# Patient Record
Sex: FEMALE | Race: OTHER
[De-identification: names, ages, dates, MRNs, and addresses within clinical notes are randomized per-mention and may not be internally consistent; named-entity substitution may affect disease eponyms.]

---

## 2019-02-04 ENCOUNTER — APPOINTMENT (OUTPATIENT)
Dept: ORTHOPEDIC SURGERY | Facility: CLINIC | Age: 44
End: 2019-02-04
Payer: COMMERCIAL

## 2019-02-04 PROCEDURE — 99213 OFFICE O/P EST LOW 20 MIN: CPT

## 2019-02-04 PROCEDURE — 73030 X-RAY EXAM OF SHOULDER: CPT | Mod: LT

## 2019-02-04 NOTE — PHYSICAL EXAM
[UE] : Sensory: Intact in bilateral upper extremities [Normal RUE] : Right Upper Extremity: No scars, rashes, lesions, ulcers, skin intact [Normal LUE] : Left Upper Extremity: No scars, rashes, lesions, ulcers, skin intact [Normal] : Alert and in no acute distress [de-identified] : Pain free active motion of head and neck. Normal appearance both shoulders. No warmth. No swelling. Right shoulder active motion normal. Intact deltoid and rotator cuff strength with manual muscle testing. Left shoulder tender over the greater tuberosity. Active motion within normal range but pain with empty can testing and weakness. Intact belly press and external rotation strength testing. [de-identified] : X-rays left shoulder AP, outlet and axillary views: Normal. No calcifications.

## 2019-02-04 NOTE — HISTORY OF PRESENT ILLNESS
[de-identified] : Left shoulder pain times several weeks. No history of specific injury. Patient has been exercising in the gym. Left shoulder hurts putting on a jacket and reaching out to the side. No treatment to date.

## 2019-02-04 NOTE — DISCUSSION/SUMMARY
[de-identified] : Discussion had with the patient. Recommend treatment with ibuprofen, ice, rest from upper body gym activities for the next several weeks. Followup in 3 weeks if symptoms persist.

## 2019-02-15 ENCOUNTER — TRANSCRIPTION ENCOUNTER (OUTPATIENT)
Age: 44
End: 2019-02-15

## 2019-02-15 ENCOUNTER — APPOINTMENT (OUTPATIENT)
Dept: ORTHOPEDIC SURGERY | Facility: CLINIC | Age: 44
End: 2019-02-15
Payer: COMMERCIAL

## 2019-02-15 VITALS
WEIGHT: 120 LBS | DIASTOLIC BLOOD PRESSURE: 67 MMHG | HEIGHT: 64 IN | HEART RATE: 69 BPM | SYSTOLIC BLOOD PRESSURE: 114 MMHG | BODY MASS INDEX: 20.49 KG/M2

## 2019-02-15 DIAGNOSIS — M75.82 OTHER SHOULDER LESIONS, LEFT SHOULDER: ICD-10-CM

## 2019-02-15 PROCEDURE — 99213 OFFICE O/P EST LOW 20 MIN: CPT | Mod: 25

## 2019-02-15 PROCEDURE — 20610 DRAIN/INJ JOINT/BURSA W/O US: CPT | Mod: LT

## 2020-07-27 ENCOUNTER — APPOINTMENT (OUTPATIENT)
Dept: ORTHOPEDIC SURGERY | Facility: CLINIC | Age: 45
End: 2020-07-27
Payer: COMMERCIAL

## 2020-07-27 VITALS
BODY MASS INDEX: 22.2 KG/M2 | WEIGHT: 130 LBS | SYSTOLIC BLOOD PRESSURE: 112 MMHG | HEIGHT: 64 IN | DIASTOLIC BLOOD PRESSURE: 71 MMHG | HEART RATE: 60 BPM

## 2020-07-27 DIAGNOSIS — S80.01XA CONTUSION OF RIGHT KNEE, INITIAL ENCOUNTER: ICD-10-CM

## 2020-07-27 DIAGNOSIS — M67.911 UNSPECIFIED DISORDER OF SYNOVIUM AND TENDON, RIGHT SHOULDER: ICD-10-CM

## 2020-07-27 PROCEDURE — 99214 OFFICE O/P EST MOD 30 MIN: CPT

## 2020-07-27 PROCEDURE — 73562 X-RAY EXAM OF KNEE 3: CPT | Mod: RT

## 2020-07-27 PROCEDURE — 73030 X-RAY EXAM OF SHOULDER: CPT | Mod: RT

## 2020-07-27 RX ORDER — DICLOFENAC SODIUM 50 MG/1
50 TABLET, DELAYED RELEASE ORAL
Qty: 40 | Refills: 1 | Status: ACTIVE | COMMUNITY
Start: 2020-07-27 | End: 1900-01-01

## 2020-07-27 NOTE — HISTORY OF PRESENT ILLNESS
[Bending] : worsened by bending [Knee Flexion] : worsened with knee flexion [Rest] : relieved by rest [de-identified] : Patient presents today with new onset right shoulder pain for several weeks and acute onset right medial sided knee pain. The shoulder has been bothering her with everyday activities and exercise. She started working with a physical therapist locally. She denies any specific injury to her right shoulder.\par \par Second complaint is right knee pain. She states she was improve her on a step down and did not realize there was a step and developed immediate onset of medial sided right knee pain. She is  medially but can walk without difficulty.

## 2020-07-27 NOTE — DISCUSSION/SUMMARY
[de-identified] : Rotator cuff tendinitis right shoulder. Knee contusion right knee. Continue to monitor clinically. Anti-inflammatory medication diclofenac prescribed. Physical therapy prescribed to treat her right shoulder. Advised on home exercise program for rotator cuff conditioning for the right shoulder.

## 2020-07-27 NOTE — PHYSICAL EXAM
[UE/LE] : Sensory: Intact in bilateral upper & lower extremities [Rad] : radial 2+ and symmetric bilaterally [Normal RUE] : Right Upper Extremity: No scars, rashes, lesions, ulcers, skin intact [Normal LUE] : Left Upper Extremity: No scars, rashes, lesions, ulcers, skin intact [Normal RLE] : Right Lower Extremity: No scars, rashes, lesions, ulcers, skin intact [Normal LLE] : Left Lower Extremity: No scars, rashes, lesions, ulcers, skin intact [Normal] : Oriented to person, place, and time, insight and judgement were intact and the affect was normal [Shoulder Instability] : negative Anterior-Posterior Slide [Pain Left Shoulder Active Forw Flexion Against Resistance] : negative Empty Can test [Shoulder Instab Anterior Apprehension (Crank) Test Left] : negative Crank test [Shoulder Muscle Weakness Supraspinatus Left Only] : negative Drop Arm test [Shoulder Pain Elicited During Seward's Test Left] : negative Umaña's test [Shoulder Pain During Lovett-Claude Impingement Test Left] : negative Lovett test [Shoulder Motion On Internal Rotation] : negative Lift Off test [Active Abduction Left Shoulder Decreased] : negative Painful Arc [Shoulder Motion On Adduction] : negative Scarf test [de-identified] : Pain-free active motion of head and neck. Right shoulder: Shrug with abduction and overhead elevation. Good passive range of motion. Pain/weakness with resisted forward elevation.\par \par Right knee: Normal alignment. Skin intact. Tender over the medial femoral condyle and mild tenderness at the medial joint line. [de-identified] : X-rays right shoulder AP, scapular Y. and axillary views: normal\par X-rays right knee AP, lateral and patellar sunrise views: normal.  No fractures

## 2023-04-05 ENCOUNTER — EMERGENCY (EMERGENCY)
Facility: HOSPITAL | Age: 48
LOS: 1 days | Discharge: HOME | End: 2023-04-05
Attending: EMERGENCY MEDICINE | Admitting: EMERGENCY MEDICINE
Payer: COMMERCIAL

## 2023-04-05 ENCOUNTER — TRANSCRIPTION ENCOUNTER (OUTPATIENT)
Age: 48
End: 2023-04-05

## 2023-04-05 VITALS
OXYGEN SATURATION: 97 % | SYSTOLIC BLOOD PRESSURE: 126 MMHG | DIASTOLIC BLOOD PRESSURE: 79 MMHG | TEMPERATURE: 98 F | HEIGHT: 64 IN | WEIGHT: 145.06 LBS | RESPIRATION RATE: 18 BRPM | HEART RATE: 73 BPM

## 2023-04-05 VITALS
DIASTOLIC BLOOD PRESSURE: 78 MMHG | OXYGEN SATURATION: 96 % | SYSTOLIC BLOOD PRESSURE: 127 MMHG | TEMPERATURE: 98 F | RESPIRATION RATE: 17 BRPM | HEART RATE: 65 BPM

## 2023-04-05 PROCEDURE — 90715 TDAP VACCINE 7 YRS/> IM: CPT

## 2023-04-05 PROCEDURE — 99284 EMERGENCY DEPT VISIT MOD MDM: CPT | Mod: 25

## 2023-04-05 PROCEDURE — 99283 EMERGENCY DEPT VISIT LOW MDM: CPT | Mod: 25

## 2023-04-05 PROCEDURE — 12001 RPR S/N/AX/GEN/TRNK 2.5CM/<: CPT

## 2023-04-05 PROCEDURE — 90471 IMMUNIZATION ADMIN: CPT

## 2023-04-05 PROCEDURE — 12001 RPR S/N/AX/GEN/TRNK 2.5CM/<: CPT | Mod: F1

## 2023-04-05 RX ORDER — TETANUS TOXOID, REDUCED DIPHTHERIA TOXOID AND ACELLULAR PERTUSSIS VACCINE, ADSORBED 5; 2.5; 8; 8; 2.5 [IU]/.5ML; [IU]/.5ML; UG/.5ML; UG/.5ML; UG/.5ML
0.5 SUSPENSION INTRAMUSCULAR ONCE
Refills: 0 | Status: COMPLETED | OUTPATIENT
Start: 2023-04-05 | End: 2023-04-05

## 2023-04-05 RX ADMIN — TETANUS TOXOID, REDUCED DIPHTHERIA TOXOID AND ACELLULAR PERTUSSIS VACCINE, ADSORBED 0.5 MILLILITER(S): 5; 2.5; 8; 8; 2.5 SUSPENSION INTRAMUSCULAR at 20:16

## 2023-04-05 NOTE — ED PROVIDER NOTE - PATIENT PORTAL LINK FT
You can access the FollowMyHealth Patient Portal offered by [facility] by registering at the following website: [email or web address]. By joining [employer]’s FollowMyHealth portal, you will also be able to view your health information using other applications (apps) compatible with our system.

## 2023-04-05 NOTE — ED PROVIDER NOTE - CLINICAL SUMMARY MEDICAL DECISION MAKING FREE TEXT BOX
48 female with left index finger laceration, not involving tendon, not involving nail plate, to be sutured, cleaned, update tetanus

## 2023-04-05 NOTE — ED ADULT NURSE NOTE - CHIEF COMPLAINT
The level of diabetic retinopathy was communicated to provider. The patient is a 48y Female complaining of lacerations.

## 2023-04-05 NOTE — ED PROCEDURE NOTE - CPROC ED SITE PREP1
Problem: Falls - Risk of:  Goal: Will remain free from falls  Description: Will remain free from falls  11/23/2020 0426 by Qiana Torres RN  Outcome: Ongoing  Note: No falls to date. Bed in lowest position with call light within reach. Floor free from obstacles and pt verbalizes understanding to call out with needs or assistance with ambulation. Falls risk score evaluated-high risk. Bed alarm activated and falling star posted. Will continue to monitor additional needs. 11/22/2020 1612 by Elroy Peabody, RN  Outcome: Ongoing  Goal: Absence of physical injury  Description: Absence of physical injury  11/23/2020 0426 by Qiana Torres RN  Outcome: Ongoing  11/22/2020 1612 by Elroy Peabody, RN  Outcome: Ongoing     Problem: Skin Integrity:  Goal: Will show no infection signs and symptoms  Description: Will show no infection signs and symptoms  11/23/2020 0426 by Qiana Torres RN  Outcome: Ongoing  11/22/2020 1612 by Elroy Peabody, RN  Outcome: Ongoing  Goal: Absence of new skin breakdown  Description: Absence of new skin breakdown  11/23/2020 0426 by Qiana Torres RN  Outcome: Ongoing  11/22/2020 1612 by Elroy Peabody, RN  Outcome: Ongoing     Problem: Pain:  Goal: Pain level will decrease  Description: Pain level will decrease  11/23/2020 0426 by Qiana Torres RN  Outcome: Ongoing  Note: Pain level assessment complete. Pt rated left hip pain on 0-10 scale. Pt educated on pain scale and control interventions. PRN pain medication given per pt request. Pt verbalizes understanding to call out with new onset of pain or unrelieved pain. Will continue to monitor.     11/22/2020 1612 by Elroy Peabody, RN  Outcome: Ongoing  Goal: Control of acute pain  Description: Control of acute pain  11/23/2020 0426 by Qiana Torres RN  Outcome: Ongoing  11/22/2020 1612 by Elroy Peabody, RN  Outcome: Ongoing  Goal: Control of chronic pain  Description: Control of chronic pain  11/22/2020 1612 by Elroy Peabody, RN  Outcome: Ongoing povidone iodine/normal saline

## 2023-04-05 NOTE — ED PROVIDER NOTE - UPPER EXTREMITY EXAM, LEFT
0.5 cm laceration, over distal PIP, able to flex and extend digit, no tendon injury, sensation intact

## 2023-04-05 NOTE — ED PROVIDER NOTE - NSFOLLOWUPINSTRUCTIONS_ED_ALL_ED_FT
Return to ER and or follow-up with urgent care or your primary care within 5 to 7 days for suture removal.  May remove dressing in 24 hours.  May wash with soap and water, pat dry, apply bacitracin or Neosporin ointment to sutures and cover with a Band-Aid.  Return to ER if having increased swelling, redness, fever and or worsening of symptoms.

## 2023-04-05 NOTE — ED PROVIDER NOTE - OBJECTIVE STATEMENT
48-year-old female with no significant past medical history presents to the ER states that she was cutting and alcohol with a knife and cut into her left index finger causing bleeding, unable to stop the bleeding and came to the emergency department.  Patient does not recall her last tetanus shot.

## 2024-03-01 ENCOUNTER — NON-APPOINTMENT (OUTPATIENT)
Age: 49
End: 2024-03-01

## 2024-05-08 ENCOUNTER — APPOINTMENT (OUTPATIENT)
Dept: BREAST CENTER | Facility: CLINIC | Age: 49
End: 2024-05-08

## 2024-05-24 ENCOUNTER — OUTPATIENT (OUTPATIENT)
Dept: OUTPATIENT SERVICES | Facility: HOSPITAL | Age: 49
LOS: 1 days | End: 2024-05-24
Payer: COMMERCIAL

## 2024-05-24 VITALS
DIASTOLIC BLOOD PRESSURE: 79 MMHG | OXYGEN SATURATION: 100 % | WEIGHT: 138.01 LBS | SYSTOLIC BLOOD PRESSURE: 127 MMHG | HEART RATE: 70 BPM | TEMPERATURE: 98 F | HEIGHT: 64 IN | RESPIRATION RATE: 16 BRPM

## 2024-05-24 DIAGNOSIS — Z98.891 HISTORY OF UTERINE SCAR FROM PREVIOUS SURGERY: Chronic | ICD-10-CM

## 2024-05-24 DIAGNOSIS — D05.12 INTRADUCTAL CARCINOMA IN SITU OF LEFT BREAST: ICD-10-CM

## 2024-05-24 DIAGNOSIS — R20.0 ANESTHESIA OF SKIN: ICD-10-CM

## 2024-05-24 DIAGNOSIS — Z98.890 OTHER SPECIFIED POSTPROCEDURAL STATES: Chronic | ICD-10-CM

## 2024-05-24 DIAGNOSIS — Z85.3 PERSONAL HISTORY OF MALIGNANT NEOPLASM OF BREAST: ICD-10-CM

## 2024-05-24 DIAGNOSIS — Z90.13 ACQUIRED ABSENCE OF BILATERAL BREASTS AND NIPPLES: ICD-10-CM

## 2024-05-24 DIAGNOSIS — Z01.818 ENCOUNTER FOR OTHER PREPROCEDURAL EXAMINATION: ICD-10-CM

## 2024-05-24 LAB
ANION GAP SERPL CALC-SCNC: 7 MMOL/L — SIGNIFICANT CHANGE UP (ref 5–17)
BLD GP AB SCN SERPL QL: SIGNIFICANT CHANGE UP
BUN SERPL-MCNC: 11 MG/DL — SIGNIFICANT CHANGE UP (ref 7–23)
CALCIUM SERPL-MCNC: 8.7 MG/DL — SIGNIFICANT CHANGE UP (ref 8.4–10.5)
CHLORIDE SERPL-SCNC: 105 MMOL/L — SIGNIFICANT CHANGE UP (ref 96–108)
CO2 SERPL-SCNC: 28 MMOL/L — SIGNIFICANT CHANGE UP (ref 22–31)
CREAT SERPL-MCNC: 0.55 MG/DL — SIGNIFICANT CHANGE UP (ref 0.5–1.3)
EGFR: 114 ML/MIN/1.73M2 — SIGNIFICANT CHANGE UP
GLUCOSE SERPL-MCNC: 80 MG/DL — SIGNIFICANT CHANGE UP (ref 70–99)
HCT VFR BLD CALC: 38.5 % — SIGNIFICANT CHANGE UP (ref 34.5–45)
HGB BLD-MCNC: 13.1 G/DL — SIGNIFICANT CHANGE UP (ref 11.5–15.5)
MCHC RBC-ENTMCNC: 30.9 PG — SIGNIFICANT CHANGE UP (ref 27–34)
MCHC RBC-ENTMCNC: 34 GM/DL — SIGNIFICANT CHANGE UP (ref 32–36)
MCV RBC AUTO: 90.8 FL — SIGNIFICANT CHANGE UP (ref 80–100)
NRBC # BLD: 0 /100 WBCS — SIGNIFICANT CHANGE UP (ref 0–0)
PLATELET # BLD AUTO: 331 K/UL — SIGNIFICANT CHANGE UP (ref 150–400)
POTASSIUM SERPL-MCNC: 3.9 MMOL/L — SIGNIFICANT CHANGE UP (ref 3.5–5.3)
POTASSIUM SERPL-SCNC: 3.9 MMOL/L — SIGNIFICANT CHANGE UP (ref 3.5–5.3)
RBC # BLD: 4.24 M/UL — SIGNIFICANT CHANGE UP (ref 3.8–5.2)
RBC # FLD: 12.5 % — SIGNIFICANT CHANGE UP (ref 10.3–14.5)
SODIUM SERPL-SCNC: 140 MMOL/L — SIGNIFICANT CHANGE UP (ref 135–145)
WBC # BLD: 7.31 K/UL — SIGNIFICANT CHANGE UP (ref 3.8–10.5)
WBC # FLD AUTO: 7.31 K/UL — SIGNIFICANT CHANGE UP (ref 3.8–10.5)

## 2024-05-24 NOTE — H&P PST ADULT - NSANTHOSAYNRD_GEN_A_CORE
No. MANNIE screening performed.  STOP BANG Legend: 0-2 = LOW Risk; 3-4 = INTERMEDIATE Risk; 5-8 = HIGH Risk

## 2024-05-24 NOTE — H&P PST ADULT - HISTORY OF PRESENT ILLNESS
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  Lesion noted on routine imaging in left breast.  Biopsy confirmed malignancy.  Denies any breast pain, palpable masses or nipple discharge b/l.  Otherwise pt feels well today and denies any acute symptoms.

## 2024-05-28 LAB — SURGICAL PATHOLOGY STUDY: SIGNIFICANT CHANGE UP

## 2024-05-28 PROCEDURE — 86901 BLOOD TYPING SEROLOGIC RH(D): CPT

## 2024-05-28 PROCEDURE — G0463: CPT

## 2024-05-28 PROCEDURE — 86850 RBC ANTIBODY SCREEN: CPT

## 2024-05-28 PROCEDURE — 86900 BLOOD TYPING SEROLOGIC ABO: CPT

## 2024-05-28 PROCEDURE — 88321 CONSLTJ&REPRT SLD PREP ELSWR: CPT

## 2024-05-28 PROCEDURE — 36415 COLL VENOUS BLD VENIPUNCTURE: CPT

## 2024-05-28 PROCEDURE — 85027 COMPLETE CBC AUTOMATED: CPT

## 2024-05-28 PROCEDURE — 80048 BASIC METABOLIC PNL TOTAL CA: CPT

## 2024-05-30 ENCOUNTER — TRANSCRIPTION ENCOUNTER (OUTPATIENT)
Age: 49
End: 2024-05-30

## 2024-05-31 ENCOUNTER — TRANSCRIPTION ENCOUNTER (OUTPATIENT)
Age: 49
End: 2024-05-31

## 2024-05-31 ENCOUNTER — INPATIENT (INPATIENT)
Facility: HOSPITAL | Age: 49
LOS: 2 days | Discharge: HOME | DRG: 581 | End: 2024-06-03
Attending: SURGERY | Admitting: SURGERY
Payer: COMMERCIAL

## 2024-05-31 VITALS
WEIGHT: 138.01 LBS | OXYGEN SATURATION: 100 % | TEMPERATURE: 98 F | HEIGHT: 64 IN | SYSTOLIC BLOOD PRESSURE: 108 MMHG | RESPIRATION RATE: 13 BRPM | HEART RATE: 59 BPM | DIASTOLIC BLOOD PRESSURE: 75 MMHG

## 2024-05-31 DIAGNOSIS — Z98.891 HISTORY OF UTERINE SCAR FROM PREVIOUS SURGERY: Chronic | ICD-10-CM

## 2024-05-31 DIAGNOSIS — R20.0 ANESTHESIA OF SKIN: ICD-10-CM

## 2024-05-31 DIAGNOSIS — Z98.890 OTHER SPECIFIED POSTPROCEDURAL STATES: Chronic | ICD-10-CM

## 2024-05-31 LAB
ABO RH CONFIRMATION: SIGNIFICANT CHANGE UP
ANION GAP SERPL CALC-SCNC: 7 MMOL/L — SIGNIFICANT CHANGE UP (ref 5–17)
BUN SERPL-MCNC: 10 MG/DL — SIGNIFICANT CHANGE UP (ref 7–23)
CALCIUM SERPL-MCNC: 7.5 MG/DL — LOW (ref 8.4–10.5)
CHLORIDE SERPL-SCNC: 106 MMOL/L — SIGNIFICANT CHANGE UP (ref 96–108)
CO2 SERPL-SCNC: 26 MMOL/L — SIGNIFICANT CHANGE UP (ref 22–31)
CREAT SERPL-MCNC: 0.73 MG/DL — SIGNIFICANT CHANGE UP (ref 0.5–1.3)
EGFR: 103 ML/MIN/1.73M2 — SIGNIFICANT CHANGE UP
GLUCOSE SERPL-MCNC: 190 MG/DL — HIGH (ref 70–99)
HCT VFR BLD CALC: 30.7 % — LOW (ref 34.5–45)
HGB BLD-MCNC: 10.4 G/DL — LOW (ref 11.5–15.5)
MCHC RBC-ENTMCNC: 31 PG — SIGNIFICANT CHANGE UP (ref 27–34)
MCHC RBC-ENTMCNC: 33.9 GM/DL — SIGNIFICANT CHANGE UP (ref 32–36)
MCV RBC AUTO: 91.4 FL — SIGNIFICANT CHANGE UP (ref 80–100)
NRBC # BLD: 0 /100 WBCS — SIGNIFICANT CHANGE UP (ref 0–0)
PLATELET # BLD AUTO: 233 K/UL — SIGNIFICANT CHANGE UP (ref 150–400)
POTASSIUM SERPL-MCNC: 4.1 MMOL/L — SIGNIFICANT CHANGE UP (ref 3.5–5.3)
POTASSIUM SERPL-SCNC: 4.1 MMOL/L — SIGNIFICANT CHANGE UP (ref 3.5–5.3)
RBC # BLD: 3.36 M/UL — LOW (ref 3.8–5.2)
RBC # FLD: 12.7 % — SIGNIFICANT CHANGE UP (ref 10.3–14.5)
SODIUM SERPL-SCNC: 139 MMOL/L — SIGNIFICANT CHANGE UP (ref 135–145)
WBC # BLD: 13.2 K/UL — HIGH (ref 3.8–10.5)
WBC # FLD AUTO: 13.2 K/UL — HIGH (ref 3.8–10.5)

## 2024-05-31 PROCEDURE — 88305 TISSUE EXAM BY PATHOLOGIST: CPT | Mod: 26

## 2024-05-31 PROCEDURE — 88307 TISSUE EXAM BY PATHOLOGIST: CPT | Mod: 26

## 2024-05-31 PROCEDURE — 88333 PATH CONSLTJ SURG CYTO XM 1: CPT | Mod: 26

## 2024-05-31 PROCEDURE — 76098 X-RAY EXAM SURGICAL SPECIMEN: CPT | Mod: 26

## 2024-05-31 PROCEDURE — ZZZZZ: CPT

## 2024-05-31 PROCEDURE — 88329 PATH CONSLTJ DRG SURG: CPT | Mod: 59

## 2024-05-31 DEVICE — GRAFT NERVE CONNECTOR 2X10MM: Type: IMPLANTABLE DEVICE | Site: BILATERAL | Status: FUNCTIONAL

## 2024-05-31 DEVICE — CARTRIDGE MICROCLIP 30: Type: IMPLANTABLE DEVICE | Site: BILATERAL | Status: FUNCTIONAL

## 2024-05-31 DEVICE — CLIP APPLIER ETHICON LIGACLIP 11.5" MEDIUM: Type: IMPLANTABLE DEVICE | Site: BILATERAL | Status: FUNCTIONAL

## 2024-05-31 DEVICE — COUPLER VESSEL ANASTOMOTIC 2.5MM: Type: IMPLANTABLE DEVICE | Site: BILATERAL | Status: FUNCTIONAL

## 2024-05-31 DEVICE — LIGATING CLIPS WECK HORIZON SMALL (YELLOW) 24: Type: IMPLANTABLE DEVICE | Site: BILATERAL | Status: FUNCTIONAL

## 2024-05-31 DEVICE — COUPLER VESSEL ANASTOMOTIC 3MM: Type: IMPLANTABLE DEVICE | Site: BILATERAL | Status: FUNCTIONAL

## 2024-05-31 DEVICE — LIGATING CLIPS WECK HORIZON MEDIUM (BLUE) 6: Type: IMPLANTABLE DEVICE | Site: BILATERAL | Status: FUNCTIONAL

## 2024-05-31 DEVICE — CLIP APPLIER ETHICON LIGACLIP 9 3/8" SMALL: Type: IMPLANTABLE DEVICE | Site: BILATERAL | Status: FUNCTIONAL

## 2024-05-31 RX ORDER — HYDROMORPHONE HYDROCHLORIDE 2 MG/ML
1 INJECTION INTRAMUSCULAR; INTRAVENOUS; SUBCUTANEOUS
Refills: 0 | Status: DISCONTINUED | OUTPATIENT
Start: 2024-05-31 | End: 2024-05-31

## 2024-05-31 RX ORDER — ACETAMINOPHEN 500 MG
975 TABLET ORAL EVERY 8 HOURS
Refills: 0 | Status: COMPLETED | OUTPATIENT
Start: 2024-05-31 | End: 2025-04-29

## 2024-05-31 RX ORDER — HEPARIN SODIUM 5000 [USP'U]/ML
5000 INJECTION INTRAVENOUS; SUBCUTANEOUS EVERY 12 HOURS
Refills: 0 | Status: DISCONTINUED | OUTPATIENT
Start: 2024-05-31 | End: 2024-06-03

## 2024-05-31 RX ORDER — METOCLOPRAMIDE HCL 10 MG
10 TABLET ORAL EVERY 8 HOURS
Refills: 0 | Status: DISCONTINUED | OUTPATIENT
Start: 2024-05-31 | End: 2024-06-03

## 2024-05-31 RX ORDER — ONDANSETRON 8 MG/1
4 TABLET, FILM COATED ORAL EVERY 6 HOURS
Refills: 0 | Status: DISCONTINUED | OUTPATIENT
Start: 2024-05-31 | End: 2024-06-03

## 2024-05-31 RX ORDER — ACETAMINOPHEN 500 MG
1000 TABLET ORAL EVERY 8 HOURS
Refills: 0 | Status: COMPLETED | OUTPATIENT
Start: 2024-05-31 | End: 2024-06-02

## 2024-05-31 RX ORDER — SODIUM CHLORIDE 9 MG/ML
1000 INJECTION, SOLUTION INTRAVENOUS
Refills: 0 | Status: DISCONTINUED | OUTPATIENT
Start: 2024-05-31 | End: 2024-05-31

## 2024-05-31 RX ORDER — OXYCODONE HYDROCHLORIDE 5 MG/1
5 TABLET ORAL EVERY 4 HOURS
Refills: 0 | Status: DISCONTINUED | OUTPATIENT
Start: 2024-05-31 | End: 2024-06-03

## 2024-05-31 RX ORDER — CEFAZOLIN SODIUM 1 G
2000 VIAL (EA) INJECTION EVERY 8 HOURS
Refills: 0 | Status: COMPLETED | OUTPATIENT
Start: 2024-05-31 | End: 2024-06-01

## 2024-05-31 RX ORDER — KETOROLAC TROMETHAMINE 30 MG/ML
15 SYRINGE (ML) INJECTION EVERY 6 HOURS
Refills: 0 | Status: DISCONTINUED | OUTPATIENT
Start: 2024-05-31 | End: 2024-06-01

## 2024-05-31 RX ORDER — ONDANSETRON 8 MG/1
4 TABLET, FILM COATED ORAL ONCE
Refills: 0 | Status: DISCONTINUED | OUTPATIENT
Start: 2024-05-31 | End: 2024-05-31

## 2024-05-31 RX ORDER — OXYCODONE HYDROCHLORIDE 5 MG/1
10 TABLET ORAL EVERY 4 HOURS
Refills: 0 | Status: DISCONTINUED | OUTPATIENT
Start: 2024-05-31 | End: 2024-06-03

## 2024-05-31 RX ORDER — SODIUM CHLORIDE 9 MG/ML
1000 INJECTION, SOLUTION INTRAVENOUS
Refills: 0 | Status: DISCONTINUED | OUTPATIENT
Start: 2024-05-31 | End: 2024-06-02

## 2024-05-31 RX ORDER — IBUPROFEN 200 MG
400 TABLET ORAL EVERY 8 HOURS
Refills: 0 | Status: DISCONTINUED | OUTPATIENT
Start: 2024-05-31 | End: 2024-06-03

## 2024-05-31 RX ORDER — HYDROMORPHONE HYDROCHLORIDE 2 MG/ML
0.5 INJECTION INTRAMUSCULAR; INTRAVENOUS; SUBCUTANEOUS EVERY 4 HOURS
Refills: 0 | Status: DISCONTINUED | OUTPATIENT
Start: 2024-05-31 | End: 2024-06-03

## 2024-05-31 RX ORDER — HYDROMORPHONE HYDROCHLORIDE 2 MG/ML
0.5 INJECTION INTRAMUSCULAR; INTRAVENOUS; SUBCUTANEOUS
Refills: 0 | Status: DISCONTINUED | OUTPATIENT
Start: 2024-05-31 | End: 2024-05-31

## 2024-05-31 RX ADMIN — Medication 1000 MILLIGRAM(S): at 22:30

## 2024-05-31 RX ADMIN — SODIUM CHLORIDE 50 MILLILITER(S): 9 INJECTION, SOLUTION INTRAVENOUS at 06:05

## 2024-05-31 RX ADMIN — HYDROMORPHONE HYDROCHLORIDE 0.5 MILLIGRAM(S): 2 INJECTION INTRAMUSCULAR; INTRAVENOUS; SUBCUTANEOUS at 20:47

## 2024-05-31 RX ADMIN — Medication 100 MILLIGRAM(S): at 22:07

## 2024-05-31 RX ADMIN — HEPARIN SODIUM 5000 UNIT(S): 5000 INJECTION INTRAVENOUS; SUBCUTANEOUS at 20:47

## 2024-05-31 RX ADMIN — HYDROMORPHONE HYDROCHLORIDE 0.5 MILLIGRAM(S): 2 INJECTION INTRAMUSCULAR; INTRAVENOUS; SUBCUTANEOUS at 16:03

## 2024-05-31 RX ADMIN — HYDROMORPHONE HYDROCHLORIDE 0.5 MILLIGRAM(S): 2 INJECTION INTRAMUSCULAR; INTRAVENOUS; SUBCUTANEOUS at 16:15

## 2024-05-31 RX ADMIN — Medication 400 MILLIGRAM(S): at 22:07

## 2024-05-31 RX ADMIN — HYDROMORPHONE HYDROCHLORIDE 0.5 MILLIGRAM(S): 2 INJECTION INTRAMUSCULAR; INTRAVENOUS; SUBCUTANEOUS at 21:15

## 2024-05-31 RX ADMIN — Medication 15 MILLIGRAM(S): at 17:15

## 2024-05-31 RX ADMIN — Medication 15 MILLIGRAM(S): at 17:04

## 2024-05-31 NOTE — DISCHARGE NOTE PROVIDER - CARE PROVIDER_API CALL
Zoila Whiting-Brittany  Surgery  [address], [address]  [place] [address]  Phone: [phone]  Fax: [phone]  Follow Up Time: 2 weeks    Jesse So  Plastic Surgery  [address], [address]  [place] [address]  Phone: [phone]  Fax: [phone]  Follow Up Time: 1 week

## 2024-05-31 NOTE — BRIEF OPERATIVE NOTE - NSICDXBRIEFPREOP_GEN_ALL_CORE_FT
PRE-OP DIAGNOSIS:  Breast cancer, left 31-May-2024 13:09:23  Jennifer Hill  
PRE-OP DIAGNOSIS:  Breast cancer, left 31-May-2024 13:09:23  Jennifer Hlil

## 2024-05-31 NOTE — DISCHARGE NOTE PROVIDER - HOSPITAL COURSE
[FreeTextEntry1] : Surgery Recommended: Endovascular Stent Embolization of cerebral aneurysm.\par \par 
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 Lesion was noted on routine imaging in left breast. Biopsy confirmed malignancy.  Denies any breast pain, palpable masses or nipple discharge b/l. Was scheduled for b/l mastectomy, SLNBx and reconstruction with b/l DIEPs with nerve coaptation through allografts. She underwent planned procedures on 5/31 without issue. Postoperatively, she was monitored without concern.   On the day of discharge, patient was ambulating, tolerating PO, electrolytes repleted as necessary, performing ADLs as necessary, stable for discharge with appropriate outpatient care and follow-up.

## 2024-05-31 NOTE — BRIEF OPERATIVE NOTE - NSICDXBRIEFPROCEDURE_GEN_ALL_CORE_FT
PROCEDURES:  Bilateral mastectomy with sentinel node biopsy 31-May-2024 13:09:10  Jennifer Hill  
PROCEDURES:  DANIEL flap, free 31-May-2024 16:08:15  Collins Billings

## 2024-05-31 NOTE — DISCHARGE NOTE PROVIDER - NSDCCPTREATMENT_GEN_ALL_CORE_FT
PRINCIPAL PROCEDURE  Procedure: DANIEL flap, free  Findings and Treatment:       SECONDARY PROCEDURE  Procedure: Bilateral mastectomy with sentinel node biopsy  Findings and Treatment:

## 2024-05-31 NOTE — BRIEF OPERATIVE NOTE - SPECIMENS
as dictated
Bilateral breasts, Right and Left [place] lymph nodes, L palpable lymph node, Right and Left retronipple tissue

## 2024-05-31 NOTE — BRIEF OPERATIVE NOTE - NSICDXBRIEFPOSTOP_GEN_ALL_CORE_FT
POST-OP DIAGNOSIS:  Breast cancer, left 31-May-2024 13:09:28  Jennifer Hill  
POST-OP DIAGNOSIS:  Breast cancer, left 31-May-2024 13:09:28  Jnenifer Hill

## 2024-05-31 NOTE — DISCHARGE NOTE PROVIDER - PROVIDER TOKENS
PROVIDER:[TOKEN:[[id number]],FOLLOWUP:[2 weeks]],PROVIDER:[TOKEN:[[id number]],FOLLOWUP:[1 week]]

## 2024-05-31 NOTE — DISCHARGE NOTE PROVIDER - NSDCFUADDAPPT_GEN_ALL_CORE_FT
[phone] follow up with Dr. Whiting in 2 weeks, please call the office for an appointment    follow up with Dr. So on Thursday 6/6/24

## 2024-05-31 NOTE — DISCHARGE NOTE PROVIDER - NSDCMRMEDTOKEN_GEN_ALL_CORE_FT
Colace 100 mg oral capsule: 2 cap(s) orally once a day (at bedtime)  escitalopram 20 mg oral tablet: 1 tab(s) orally once a day  hydrOXYzine hydrochloride 25 mg oral tablet: 1 tab(s) orally once a day

## 2024-05-31 NOTE — DISCHARGE NOTE PROVIDER - NSDCFUADDINST_GEN_ALL_CORE_FT
Activity:  - Rest at home during the first few days after surgery.  - Walking is encouraged, but strenuous exercise is not allowed until 6 weeks after surgery.   - Avoid strenuous activity. Do not lift your arms above your head. Do not lift more than 5-10 pounds.    Sleep:  Sleep on your back for the first two weeks after surgery.    Showering:  - You may shower when instructed that this is permitted per Dr. So.  - Do not take a bath or submerge yourself in water.  - You will have special adhesive glue or tape over the incisions. Do not take these off.    For the Breast:  You have just undergone a breast reconstruction with the DANIEL flap. Your breast will likely have bruising and possibly some blistering on the skin, which is expected after a mastectomy. You have a small patch of skin on your breasts, which is a different color than the surrounding breast skin. This paddle of skin comes from the abdomen and is an indicator of how the flap is doing. It is important to check this skin paddle daily. The skin should remain the same color. If the color of the small patch changes (i.e blue, purple, pale), please call the office immediately.    For the Abdomen:  Your incision and belly button are covered in a special medical grade sealant, which will come off in the office, 2-3 weeks after surgery.    Drains:  Both the breast and abdomen will have drains. It is important to empty the drains twice daily and record the outputs. Please bring this sheet to your appointment after surgery. Based on the output, the drains will be removed 1 to 3 weeks after surgery. For the drains to be working appropriately, the bulbs need to be collapsed to create a light suction. The nurse in the hospital will review the drain care with you and your family prior to discharge home. It is best to safely secure the drains to your clothes with a safety pin.    In an effort to make you more comfortable with discharge home and to answer any questions you may have, these instructions are for you. However, you may call the office at at any time with additional questions or concerns.    Call the Office:  Do not hesitate to call the office with any concerns or questions. A doctor is available to answer your questions 24 hours a day.   Please notify us if:  1. You have increased swelling, pain, or color change in the breast.  2. One breast becomes suddenly significantly larger than the other breast.  3. You have a sudden increase in swelling of the abdomen.  4. You have redness develop around the incisions.  5. You have a fever greater than 101 F.  6. You develop sudden increase in pain.  7. You develop drainage, spreading redness or foul odor  8. You have any questions.

## 2024-05-31 NOTE — DISCHARGE NOTE PROVIDER - NSDCCPCAREPLAN_GEN_ALL_CORE_FT
PRINCIPAL DISCHARGE DIAGNOSIS  Diagnosis: Intraductal carcinoma in situ of left breast  Assessment and Plan of Treatment:

## 2024-05-31 NOTE — PROGRESS NOTE ADULT - SUBJECTIVE AND OBJECTIVE BOX
HPI:    49y Female PMH of Breast CA, now POD#0from b/l Mastectomy, with DANIEL/free flap placement, as well as VIOPTIX tissue/flap oxygenation monitoring.     Current Vioptix:    Right Breast: 73%  Left Breast: 83%     Current Signal Strength    Right Breast: 90  Left Breast: 83      Interval Hx:      PAST MEDICAL & SURGICAL HISTORY:  Intraductal carcinoma in situ of left breast      Anxiety      S/P       S/P       S/P       H/O rhinoplasty          MEDICATIONS  (STANDING):  acetaminophen     Tablet .. 975 milliGRAM(s) Oral every 8 hours  acetaminophen   IVPB .. 1000 milliGRAM(s) IV Intermittent every 8 hours  ceFAZolin   IVPB 2000 milliGRAM(s) IV Intermittent every 8 hours  heparin   Injectable 5000 Unit(s) SubCutaneous every 12 hours  ibuprofen  Tablet. 400 milliGRAM(s) Oral every 8 hours  ketorolac   Injectable 15 milliGRAM(s) IV Push every 6 hours  lactated ringers. 1000 milliLiter(s) (125 mL/Hr) IV Continuous <Continuous>    MEDICATIONS  (PRN):  HYDROmorphone  Injectable 0.5 milliGRAM(s) IV Push every 4 hours PRN Severe Pain (7 - 10)  metoclopramide Injectable 10 milliGRAM(s) IV Push every 8 hours PRN Nausea and/or Vomiting  ondansetron Injectable 4 milliGRAM(s) IV Push every 6 hours PRN Nausea and/or Vomiting  oxyCODONE    IR 5 milliGRAM(s) Oral every 4 hours PRN Moderate Pain (4 - 6)  oxyCODONE    IR 10 milliGRAM(s) Oral every 4 hours PRN Severe Pain (7 - 10)      Review of Systems:  CONSTITUTIONAL: No fever, chills, or fatigue  EYES: No eye pain, visual disturbances, or discharge  ENMT:  No difficulty hearing, tinnitus, vertigo; No sinus or throat pain  NECK: No pain or stiffness  RESPIRATORY: No cough, wheezing, chills or hemoptysis; No shortness of breath  CARDIOVASCULAR: No chest pain, palpitations, dizziness, or leg swelling  GASTROINTESTINAL: No abdominal or epigastric pain. No nausea, vomiting, or hematemesis; No diarrhea or constipation. No melena or hematochezia.  GENITOURINARY: No dysuria, frequency, hematuria, or incontinence  NEUROLOGICAL: No headaches, memory loss, loss of strength, numbness, or tremors  SKIN: No itching, burning, rashes, or lesions   MUSCULOSKELETAL: No joint pain or swelling; No muscle, back, or extremity pain  PSYCHIATRIC: No depression, anxiety, mood swings, or difficulty sleeping      ICU Vital Signs Last 24 Hrs  T(C): 37.1 (31 May 2024 23:00), Max: 37.1 (31 May 2024 23:00)  T(F): 98.8 (31 May 2024 23:00), Max: 98.8 (31 May 2024 23:00)  HR: 82 (2024 04:00) (66 - 93)  BP: 106/60 (2024 04:00) (105/61 - 125/75)  BP(mean): --  ABP: --  ABP(mean): --  RR: 16 (2024 04:00) (15 - 20)  SpO2: 100% (2024 04:00) (99% - 100%)    O2 Parameters below as of 2024 04:00  Patient On (Oxygen Delivery Method): nasal cannula  O2 Flow (L/min): 4                                  10.4   13.20 )-----------( 233      ( 31 May 2024 16:15 )             30.7       05-31    139  |  106  |  10  ----------------------------<  190<H>  4.1   |  26  |  0.73    Ca    7.5<L>      31 May 2024 16:15            Physical Examination:    General: No acute distress.  Alert, oriented, interactive, nonfocal    BREAST: breasts appears symmetrical, no signs of hematoma, soft to palpation, non tender, well perfused, turgor maintained,cap refill adequate. Viotpix in place functioning well. ELIAN drains in place with serosanguinous drainage.     HEENT: Pupils equal, reactive to light.  Symmetric.    PULM: Clear to auscultation bilaterally, no significant sputum production    CVS: Regular rate and rhythm, no murmurs, rubs, or gallops    ABD: Soft, nondistended, nontender, normoactive bowel sounds, no masses. Flap removal site well approximated, staples in palce. No ative drainage, bleeding, or signs of infection.    EXT: No edema, nontender    SKIN: Warm and well perfused, no rashes noted.      Plan:  -Multi-modal pain regimen  -Q1 Vioptix check  -Monitor ELIAN drain outputs  -NPO. Zofran PRN  -Encourage incentive spirometry  -Elliott in place  -DVT PPX w/ SCD and Heparin SC

## 2024-06-01 LAB
ANION GAP SERPL CALC-SCNC: 4 MMOL/L — LOW (ref 5–17)
BUN SERPL-MCNC: 9 MG/DL — SIGNIFICANT CHANGE UP (ref 7–23)
CALCIUM SERPL-MCNC: 7.7 MG/DL — LOW (ref 8.4–10.5)
CHLORIDE SERPL-SCNC: 108 MMOL/L — SIGNIFICANT CHANGE UP (ref 96–108)
CO2 SERPL-SCNC: 30 MMOL/L — SIGNIFICANT CHANGE UP (ref 22–31)
CREAT SERPL-MCNC: 0.68 MG/DL — SIGNIFICANT CHANGE UP (ref 0.5–1.3)
EGFR: 108 ML/MIN/1.73M2 — SIGNIFICANT CHANGE UP
GLUCOSE SERPL-MCNC: 119 MG/DL — HIGH (ref 70–99)
HCT VFR BLD CALC: 29.6 % — LOW (ref 34.5–45)
HGB BLD-MCNC: 10.1 G/DL — LOW (ref 11.5–15.5)
MCHC RBC-ENTMCNC: 31 PG — SIGNIFICANT CHANGE UP (ref 27–34)
MCHC RBC-ENTMCNC: 34.1 GM/DL — SIGNIFICANT CHANGE UP (ref 32–36)
MCV RBC AUTO: 90.8 FL — SIGNIFICANT CHANGE UP (ref 80–100)
NRBC # BLD: 0 /100 WBCS — SIGNIFICANT CHANGE UP (ref 0–0)
PLATELET # BLD AUTO: 234 K/UL — SIGNIFICANT CHANGE UP (ref 150–400)
POTASSIUM SERPL-MCNC: 4.1 MMOL/L — SIGNIFICANT CHANGE UP (ref 3.5–5.3)
POTASSIUM SERPL-SCNC: 4.1 MMOL/L — SIGNIFICANT CHANGE UP (ref 3.5–5.3)
RBC # BLD: 3.26 M/UL — LOW (ref 3.8–5.2)
RBC # FLD: 12.8 % — SIGNIFICANT CHANGE UP (ref 10.3–14.5)
SODIUM SERPL-SCNC: 142 MMOL/L — SIGNIFICANT CHANGE UP (ref 135–145)
WBC # BLD: 8.26 K/UL — SIGNIFICANT CHANGE UP (ref 3.8–10.5)
WBC # FLD AUTO: 8.26 K/UL — SIGNIFICANT CHANGE UP (ref 3.8–10.5)

## 2024-06-01 RX ORDER — ESCITALOPRAM OXALATE 10 MG/1
20 TABLET, FILM COATED ORAL DAILY
Refills: 0 | Status: DISCONTINUED | OUTPATIENT
Start: 2024-06-01 | End: 2024-06-03

## 2024-06-01 RX ORDER — SENNA PLUS 8.6 MG/1
1 TABLET ORAL AT BEDTIME
Refills: 0 | Status: DISCONTINUED | OUTPATIENT
Start: 2024-06-01 | End: 2024-06-03

## 2024-06-01 RX ADMIN — Medication 100 MILLIGRAM(S): at 06:26

## 2024-06-01 RX ADMIN — Medication 15 MILLIGRAM(S): at 10:19

## 2024-06-01 RX ADMIN — HEPARIN SODIUM 5000 UNIT(S): 5000 INJECTION INTRAVENOUS; SUBCUTANEOUS at 10:19

## 2024-06-01 RX ADMIN — HYDROMORPHONE HYDROCHLORIDE 0.5 MILLIGRAM(S): 2 INJECTION INTRAMUSCULAR; INTRAVENOUS; SUBCUTANEOUS at 17:00

## 2024-06-01 RX ADMIN — SODIUM CHLORIDE 125 MILLILITER(S): 9 INJECTION, SOLUTION INTRAVENOUS at 05:24

## 2024-06-01 RX ADMIN — Medication 1000 MILLIGRAM(S): at 11:00

## 2024-06-01 RX ADMIN — ESCITALOPRAM OXALATE 20 MILLIGRAM(S): 10 TABLET, FILM COATED ORAL at 13:22

## 2024-06-01 RX ADMIN — HYDROMORPHONE HYDROCHLORIDE 0.5 MILLIGRAM(S): 2 INJECTION INTRAMUSCULAR; INTRAVENOUS; SUBCUTANEOUS at 22:15

## 2024-06-01 RX ADMIN — HYDROMORPHONE HYDROCHLORIDE 0.5 MILLIGRAM(S): 2 INJECTION INTRAMUSCULAR; INTRAVENOUS; SUBCUTANEOUS at 18:25

## 2024-06-01 RX ADMIN — Medication 400 MILLIGRAM(S): at 15:35

## 2024-06-01 RX ADMIN — Medication 15 MILLIGRAM(S): at 15:35

## 2024-06-01 RX ADMIN — HEPARIN SODIUM 5000 UNIT(S): 5000 INJECTION INTRAVENOUS; SUBCUTANEOUS at 20:04

## 2024-06-01 RX ADMIN — Medication 15 MILLIGRAM(S): at 15:55

## 2024-06-01 RX ADMIN — HYDROMORPHONE HYDROCHLORIDE 0.5 MILLIGRAM(S): 2 INJECTION INTRAMUSCULAR; INTRAVENOUS; SUBCUTANEOUS at 06:00

## 2024-06-01 RX ADMIN — Medication 15 MILLIGRAM(S): at 11:00

## 2024-06-01 RX ADMIN — Medication 15 MILLIGRAM(S): at 00:09

## 2024-06-01 RX ADMIN — SENNA PLUS 1 TABLET(S): 8.6 TABLET ORAL at 22:15

## 2024-06-01 RX ADMIN — HYDROMORPHONE HYDROCHLORIDE 0.5 MILLIGRAM(S): 2 INJECTION INTRAMUSCULAR; INTRAVENOUS; SUBCUTANEOUS at 05:24

## 2024-06-01 RX ADMIN — HYDROMORPHONE HYDROCHLORIDE 0.5 MILLIGRAM(S): 2 INJECTION INTRAMUSCULAR; INTRAVENOUS; SUBCUTANEOUS at 22:45

## 2024-06-01 RX ADMIN — Medication 15 MILLIGRAM(S): at 00:45

## 2024-06-01 RX ADMIN — Medication 1000 MILLIGRAM(S): at 15:55

## 2024-06-01 RX ADMIN — Medication 400 MILLIGRAM(S): at 10:19

## 2024-06-01 NOTE — PROGRESS NOTE ADULT - SUBJECTIVE AND OBJECTIVE BOX
marcial DANIEL flaps viable with pink skin paddles and Vioptix 80-90%  abd flat  JPs serosang  no collections  postop hct 30  doing well  Up in chair, reg diet, IV 50, dc lopez

## 2024-06-01 NOTE — PROGRESS NOTE ADULT - SUBJECTIVE AND OBJECTIVE BOX
Patient is a 49y old  Female who presents with a chief complaint of DANIEL (2024 08:57)      BRIEF HOSPITAL COURSE:   50 yo f pmhx anxiety on lexapro, breast cancer POD#1  b/l daniel on 24 for DCIS in left breast.      Events last 24 hours:   POD#1, no issues, pain relatively controlled, endorses feeling the need to have a BM.        PAST MEDICAL & SURGICAL HISTORY:  Intraductal carcinoma in situ of left breast  Anxiety  S/P   S/P   S/P   H/O rhinoplasty      Allergies  No Known Allergies      FAMILY HISTORY:  non contributory       Social History:   from home,       Review of Systems:  abdominal distension, overall pain control improved       Physical Examination:    General: pleasant adult female, lying in bed, nad    HEENT: NC/AT    BREAST: b/l breasts soft, skin pink, good refill.  drains with serosanguinous    PULM: grossly cta b/l    CVS: rrr     ABD: Soft, mildly distended, nontender, +bs, surgical site intact, b/l drains in place, serosanguinous drainage    EXT: No edema, nontender    SKIN: Warm and well perfused    NEURO: Alert, oriented, interactive, grossly nonfocal      Medications:  acetaminophen     Tablet .. 975 milliGRAM(s) Oral every 8 hours  acetaminophen   IVPB .. 1000 milliGRAM(s) IV Intermittent every 8 hours  escitalopram 20 milliGRAM(s) Oral daily  HYDROmorphone  Injectable 0.5 milliGRAM(s) IV Push every 4 hours PRN  ibuprofen  Tablet. 400 milliGRAM(s) Oral every 8 hours  metoclopramide Injectable 10 milliGRAM(s) IV Push every 8 hours PRN  ondansetron Injectable 4 milliGRAM(s) IV Push every 6 hours PRN  oxyCODONE    IR 5 milliGRAM(s) Oral every 4 hours PRN  oxyCODONE    IR 10 milliGRAM(s) Oral every 4 hours PRN  heparin   Injectable 5000 Unit(s) SubCutaneous every 12 hours  senna 1 Tablet(s) Oral at bedtime PRN  lactated ringers. 1000 milliLiter(s) IV Continuous <Continuous>      ICU Vital Signs Last 24 Hrs  T(C): 36.7 (2024 20:09), Max: 37.7 (2024 10:23)  T(F): 98 (2024 20:09), Max: 99.8 (2024 10:23)  HR: 80 (2024 14:48) (80 - 86)  BP: 94/69 (2024 20:09) (94/69 - 111/70)  BP(mean): --  ABP: --  ABP(mean): --  RR: 15 (2024 20:09) (15 - 16)  SpO2: 97% (:) (97% - 100%)    O2 Parameters below as of :  Patient On (Oxygen Delivery Method): room air      Vital Signs Last 24 Hrs  T(C): 36.7 (:), Max: 37.7 (2024 10:23)  T(F): 98 (2024 20:), Max: 99.8 (2024 10:23)  HR: 80 (2024 14:48) (80 - 86)  BP: 94/69 (2024 20:09) (94/69 - 111/70)  BP(mean): --  RR: 15 (2024 20:09) (15 - 16)  SpO2: 97% (:09) (97% - 100%)    Parameters below as of :  Patient On (Oxygen Delivery Method): room air      I&O's Detail    31 May 2024 07:01  -  2024 07:00  --------------------------------------------------------  IN:    Lactated Ringers: 300 mL    Lactated Ringers: 1500 mL  Total IN: 1800 mL    OUT:    Bulb (mL): 32 mL    Bulb (mL): 37 mL    Bulb (mL): 50 mL    Bulb (mL): 82 mL    Bulb (mL): 30 mL    Bulb (mL): 21 mL    Indwelling Catheter - Urethral (mL): 1500 mL  Total OUT: 1752 mL  Total NET: 48 mL      2024 07:01  -  2024 20:38  --------------------------------------------------------  IN:    Lactated Ringers: 100 mL  Total IN: 100 mL    OUT:    Bulb (mL): 25 mL    Bulb (mL): 8 mL    Bulb (mL): 60 mL    Bulb (mL): 80 mL    Bulb (mL): 10 mL    Bulb (mL): 10 mL    Voided (mL): 800 mL  Total OUT: 993 mL  Total NET: -893 mL      LABS:                        10.1   8.26  )-----------( 234      ( 2024 07:35 )             29.6     06-01    142  |  108  |  9   ----------------------------<  119<H>  4.1   |  30  |  0.68    Ca    7.7<L>      2024 07:35      Urinalysis Basic - ( 2024 07:35 )  Color: x / Appearance: x / SG: x / pH: x  Gluc: 119 mg/dL / Ketone: x  / Bili: x / Urobili: x   Blood: x / Protein: x / Nitrite: x   Leuk Esterase: x / RBC: x / WBC x   Sq Epi: x / Non Sq Epi: x / Bacteria: x      RADIOLOGY:   < from: Mammo Radiography Breast Specimen (24 @ 12:21) >    ACC: [id number] EXAM:  MG SPECIMEN RADIOGRAPH   ORDERED BY:  CASSIE THORNE     PROCEDURE DATE:  2024      INTERPRETATION:  Left breast lymph node specimen.    In the specimen there is a Sara .    IMPRESSION: As above.    --- End of Report ---    LIAM QUIÑONES MD; Attending Radiologist  This document has been electronically signed. May 31 2024 12:25PM    < end of copied text >

## 2024-06-01 NOTE — PROGRESS NOTE ADULT - SUBJECTIVE AND OBJECTIVE BOX
Rested comfortably overnight, states surgical pain is well controlled.  Has remained on bedrest, Elliott discontinued and awaiting void.  Tolerating clears.  No chest pain, SOB, nausea, vomiting, lightheadedness, or dizziness.    Vital Signs Last 24 Hrs  T(C): 37.2 (01 Jun 2024 04:00), Max: 37.2 (01 Jun 2024 04:00)  T(F): 99 (01 Jun 2024 04:00), Max: 99 (01 Jun 2024 04:00)  HR: 82 (01 Jun 2024 04:00) (66 - 93)  BP: 106/60 (01 Jun 2024 04:00) (105/61 - 125/75)  RR: 16 (01 Jun 2024 04:00) (15 - 20)  SpO2: 100% RA (01 Jun 2024 04:00) (99% - 100%)    I&O's Detail    31 May 2024 07:01  -  01 Jun 2024 07:00  --------------------------------------------------------  IN:    Lactated Ringers: 300 mL    Lactated Ringers: 1500 mL  Total IN: 1800 mL    OUT:    Bulb (mL): 32 mL    Bulb (mL): 37 mL    Bulb (mL): 50 mL    Bulb (mL): 82 mL    Bulb (mL): 30 mL    Bulb (mL): 21 mL    Indwelling Catheter - Urethral (mL): 1500 mL  Total OUT: 1752 mL    Total NET: 48 mL    Labs                        10.1   8.26  )-----------( 234      ( 01 Jun 2024 07:35 )             29.6     142  |  108  |  9   ----------------------------<  119<H>  4.1   |  30  |  0.68    Ca    7.7<L>      01 Jun 2024 07:35    Current Medications  acetaminophen     Tablet .. 975 milliGRAM(s) Oral every 8 hours  acetaminophen   IVPB .. 1000 milliGRAM(s) IV Intermittent every 8 hours  heparin   Injectable 5000 Unit(s) SubCutaneous every 12 hours  ibuprofen  Tablet. 400 milliGRAM(s) Oral every 8 hours  ketorolac   Injectable 15 milliGRAM(s) IV Push every 6 hours  lactated ringers. 1000 milliLiter(s) (50 mL/Hr) IV Continuous <Continuous>  HYDROmorphone  Injectable 0.5 milliGRAM(s) IV Push every 4 hours PRN Severe Pain (7 - 10)  metoclopramide Injectable 10 milliGRAM(s) IV Push every 8 hours PRN Nausea and/or Vomiting  ondansetron Injectable 4 milliGRAM(s) IV Push every 6 hours PRN Nausea and/or Vomiting  oxyCODONE    IR 5 milliGRAM(s) Oral every 4 hours PRN Moderate Pain (4 - 6)  oxyCODONE    IR 10 milliGRAM(s) Oral every 4 hours PRN Severe Pain (7 - 10)    Physical Exam  Gen:  WN/WD female resting in bed, NAD  ENT:  NC/AT, no JVD noted  Thorax: Symmetric with no retractions  Right breast: Mastectomy skin flap ecchymosis, soft. no areas of induration. Flap soft and pink with 2-3 second capillary refill.  Drain(s) serosanguinous.  Left breast: Mastectomy skin flap ecchymosis, soft. no areas of induration. Flap soft and pink with 2-3 second capillary refill,  Drain(s) serosanguinous.  Lung:  Good air entry, CTA b/l  CV:  S1, S2 distinct, RRR  Abd:  Soft, NT/ND.  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  Lesion was  noted on routine imaging in left breast.  Biopsy confirmed malignancy.  Denies any breast pain, palpable masses or nipple discharge b/l.      Today  Rested comfortably overnight, states surgical pain is well controlled.  Has remained on bedrest, Elliott discontinued and awaiting void.  Tolerating clears.  No chest pain, SOB, nausea, vomiting, lightheadedness, or dizziness.      PAST MEDICAL & SURGICAL HISTORY:  Intraductal carcinoma in situ of left breast  Anxiety  S/P   S/P   S/P   H/O rhinoplasty    FAMILY HISTORY: denies h/o cancer  Social History: denies smoking, drug use, social drinking    Home Medications:  Colace 100 mg oral capsule: 2 cap(s) orally once a day (at bedtime) (31 May 2024 06:00)  escitalopram 20 mg oral tablet: 1 tab(s) orally once a day (31 May 2024 05:59)  hydrOXYzine hydrochloride 25 mg oral tablet: 1 tab(s) orally once a day (31 May 2024 05:59)    Allergies: No Known Allergies  Intolerances    ROS as above        Vital Signs Last 24 Hrs  T(C): 37.2 (2024 04:00), Max: 37.2 (2024 04:00)  T(F): 99 (2024 04:00), Max: 99 (2024 04:00)  HR: 82 (2024 04:00) (66 - 93)  BP: 106/60 (2024 04:00) (105/61 - 125/75)  RR: 16 (2024 04:00) (15 - 20)  SpO2: 100% RA (2024 04:00) (99% - 100%)    I&O's Detail    31 May 2024 07:01  -  2024 07:00  --------------------------------------------------------  IN:    Lactated Ringers: 300 mL    Lactated Ringers: 1500 mL  Total IN: 1800 mL    OUT:    Bulb (mL): 32 mL    Bulb (mL): 37 mL    Bulb (mL): 50 mL    Bulb (mL): 82 mL    Bulb (mL): 30 mL    Bulb (mL): 21 mL    Indwelling Catheter - Urethral (mL): 1500 mL  Total OUT: 1752 mL    Total NET: 48 mL    Labs                        10.1   8.26  )-----------( 234      ( 2024 07:35 )             29.6     142  |  108  |  9   ----------------------------<  119<H>  4.1   |  30  |  0.68    Ca    7.7<L>      2024 07:35    Current Medications  acetaminophen     Tablet .. 975 milliGRAM(s) Oral every 8 hours  acetaminophen   IVPB .. 1000 milliGRAM(s) IV Intermittent every 8 hours  heparin   Injectable 5000 Unit(s) SubCutaneous every 12 hours  ibuprofen  Tablet. 400 milliGRAM(s) Oral every 8 hours  ketorolac   Injectable 15 milliGRAM(s) IV Push every 6 hours  lactated ringers. 1000 milliLiter(s) (50 mL/Hr) IV Continuous <Continuous>  HYDROmorphone  Injectable 0.5 milliGRAM(s) IV Push every 4 hours PRN Severe Pain (7 - 10)  metoclopramide Injectable 10 milliGRAM(s) IV Push every 8 hours PRN Nausea and/or Vomiting  ondansetron Injectable 4 milliGRAM(s) IV Push every 6 hours PRN Nausea and/or Vomiting  oxyCODONE    IR 5 milliGRAM(s) Oral every 4 hours PRN Moderate Pain (4 - 6)  oxyCODONE    IR 10 milliGRAM(s) Oral every 4 hours PRN Severe Pain (7 - 10)    Physical Exam  Gen:  WN/WD female resting in bed, NAD  ENT:  NC/AT, no JVD noted  Thorax: Symmetric with no retractions  Right breast: Mastectomy skin flap ecchymosis, soft. no areas of induration. Flap soft and pink with 2-3 second capillary refill.  Drain(s) serosanguinous.  Left breast: Mastectomy skin flap ecchymosis, soft. no areas of induration. Flap soft and pink with 2-3 second capillary refill,  Drain(s) serosanguinous.  Lung:  Good air entry, CTA b/l  CV:  S1, S2 distinct, RRR  Abd:  Soft, NT/ND.  BS present, surgical incisions C/D/I  Extrem:  No C/C/E, DP/radial pulses +2  Neuro:  No gross motor/sensory deficits  Psych:  Awake, alert and calm

## 2024-06-01 NOTE — PROGRESS NOTE ADULT - NS ATTEND AMEND GEN_ALL_CORE FT
pt seen and examined  comfortable  doing well post op  wounds intact  will transition care from ICU to surgical team

## 2024-06-01 NOTE — PROGRESS NOTE ADULT - SUBJECTIVE AND OBJECTIVE BOX
Patient is a 49y old  Female who presents with a chief complaint of DANIEL (01 Jun 2024 08:57)  pt POD # 1 s/p b/l mastectomy with DANIEL flap reconstruction  no events noted overnight  pt c/o mild incisional pain, denies n/v/f/c    Patient seen and examined at bedside    ALLERGIES:  No Known Allergies    MEDICATIONS  (STANDING):  acetaminophen     Tablet .. 975 milliGRAM(s) Oral every 8 hours  acetaminophen   IVPB .. 1000 milliGRAM(s) IV Intermittent every 8 hours  escitalopram 20 milliGRAM(s) Oral daily  heparin   Injectable 5000 Unit(s) SubCutaneous every 12 hours  ibuprofen  Tablet. 400 milliGRAM(s) Oral every 8 hours  lactated ringers. 1000 milliLiter(s) (50 mL/Hr) IV Continuous <Continuous>    MEDICATIONS  (PRN):  HYDROmorphone  Injectable 0.5 milliGRAM(s) IV Push every 4 hours PRN Severe Pain (7 - 10)  metoclopramide Injectable 10 milliGRAM(s) IV Push every 8 hours PRN Nausea and/or Vomiting  ondansetron Injectable 4 milliGRAM(s) IV Push every 6 hours PRN Nausea and/or Vomiting  oxyCODONE    IR 5 milliGRAM(s) Oral every 4 hours PRN Moderate Pain (4 - 6)  oxyCODONE    IR 10 milliGRAM(s) Oral every 4 hours PRN Severe Pain (7 - 10)  senna 1 Tablet(s) Oral at bedtime PRN Constipation    Vital Signs Last 24 Hrs  T(F): 98.6 (01 Jun 2024 14:48), Max: 99.8 (01 Jun 2024 10:23)  HR: 80 (01 Jun 2024 14:48) (78 - 86)  BP: 111/70 (01 Jun 2024 14:48) (97/68 - 125/75)  RR: 15 (01 Jun 2024 14:48) (15 - 19)  SpO2: 100% (01 Jun 2024 14:48) (99% - 100%)    I&O's Summary    31 May 2024 07:01  -  01 Jun 2024 07:00  --------------------------------------------------------  IN: 1800 mL / OUT: 1752 mL / NET: 48 mL    01 Jun 2024 07:01  -  01 Jun 2024 18:59  --------------------------------------------------------  IN: 0 mL / OUT: 993 mL / NET: -993 mL    PHYSICAL EXAM:  General: NAD, A/O x 3  ENT: MMM  Neck: Supple, No JVD  b/l DANIEL flaps are viable, NAC viable, mild ecchymosis b/l, skin paddles have good color with good cap refill, incisions c/d/i, rey drains with serosanguinous fluid  Abdomen: Soft, Nontender, Nondistended, umbilicus viable, incision c/d/i, rey drains with serosanguinous fluid  Extremities: No calf tenderness, No pitting edema    LABS:                        10.1   8.26  )-----------( 234      ( 01 Jun 2024 07:35 )             29.6     06-01    142  |  108  |  9   ----------------------------<  119  4.1   |  30  |  0.68    Ca    7.7      01 Jun 2024 07:35                                  Urinalysis Basic - ( 01 Jun 2024 07:35 )    Color: x / Appearance: x / SG: x / pH: x  Gluc: 119 mg/dL / Ketone: x  / Bili: x / Urobili: x   Blood: x / Protein: x / Nitrite: x   Leuk Esterase: x / RBC: x / WBC x   Sq Epi: x / Non Sq Epi: x / Bacteria: x            RADIOLOGY & ADDITIONAL TESTS:    Care Discussed with Consultants/Other Providers:

## 2024-06-02 RX ORDER — ACETAMINOPHEN 500 MG
975 TABLET ORAL EVERY 8 HOURS
Refills: 0 | Status: DISCONTINUED | OUTPATIENT
Start: 2024-06-02 | End: 2024-06-03

## 2024-06-02 RX ORDER — LANOLIN ALCOHOL/MO/W.PET/CERES
3 CREAM (GRAM) TOPICAL AT BEDTIME
Refills: 0 | Status: DISCONTINUED | OUTPATIENT
Start: 2024-06-02 | End: 2024-06-03

## 2024-06-02 RX ADMIN — OXYCODONE HYDROCHLORIDE 10 MILLIGRAM(S): 5 TABLET ORAL at 19:39

## 2024-06-02 RX ADMIN — HEPARIN SODIUM 5000 UNIT(S): 5000 INJECTION INTRAVENOUS; SUBCUTANEOUS at 07:52

## 2024-06-02 RX ADMIN — OXYCODONE HYDROCHLORIDE 5 MILLIGRAM(S): 5 TABLET ORAL at 07:59

## 2024-06-02 RX ADMIN — OXYCODONE HYDROCHLORIDE 5 MILLIGRAM(S): 5 TABLET ORAL at 17:30

## 2024-06-02 RX ADMIN — ESCITALOPRAM OXALATE 20 MILLIGRAM(S): 10 TABLET, FILM COATED ORAL at 11:31

## 2024-06-02 RX ADMIN — OXYCODONE HYDROCHLORIDE 10 MILLIGRAM(S): 5 TABLET ORAL at 13:19

## 2024-06-02 RX ADMIN — SODIUM CHLORIDE 50 MILLILITER(S): 9 INJECTION, SOLUTION INTRAVENOUS at 05:55

## 2024-06-02 RX ADMIN — Medication 400 MILLIGRAM(S): at 01:41

## 2024-06-02 RX ADMIN — Medication 975 MILLIGRAM(S): at 21:11

## 2024-06-02 RX ADMIN — Medication 975 MILLIGRAM(S): at 14:00

## 2024-06-02 RX ADMIN — OXYCODONE HYDROCHLORIDE 10 MILLIGRAM(S): 5 TABLET ORAL at 20:39

## 2024-06-02 RX ADMIN — OXYCODONE HYDROCHLORIDE 5 MILLIGRAM(S): 5 TABLET ORAL at 16:52

## 2024-06-02 RX ADMIN — SENNA PLUS 1 TABLET(S): 8.6 TABLET ORAL at 21:11

## 2024-06-02 RX ADMIN — Medication 975 MILLIGRAM(S): at 22:11

## 2024-06-02 RX ADMIN — HEPARIN SODIUM 5000 UNIT(S): 5000 INJECTION INTRAVENOUS; SUBCUTANEOUS at 21:11

## 2024-06-02 RX ADMIN — HYDROMORPHONE HYDROCHLORIDE 0.5 MILLIGRAM(S): 2 INJECTION INTRAMUSCULAR; INTRAVENOUS; SUBCUTANEOUS at 04:02

## 2024-06-02 RX ADMIN — HYDROMORPHONE HYDROCHLORIDE 0.5 MILLIGRAM(S): 2 INJECTION INTRAMUSCULAR; INTRAVENOUS; SUBCUTANEOUS at 03:47

## 2024-06-02 RX ADMIN — OXYCODONE HYDROCHLORIDE 10 MILLIGRAM(S): 5 TABLET ORAL at 14:00

## 2024-06-02 RX ADMIN — OXYCODONE HYDROCHLORIDE 5 MILLIGRAM(S): 5 TABLET ORAL at 08:49

## 2024-06-02 RX ADMIN — Medication 1000 MILLIGRAM(S): at 02:00

## 2024-06-02 RX ADMIN — Medication 975 MILLIGRAM(S): at 13:00

## 2024-06-02 RX ADMIN — Medication 3 MILLIGRAM(S): at 21:11

## 2024-06-02 NOTE — PROGRESS NOTE ADULT - SUBJECTIVE AND OBJECTIVE BOX
pink DANIEL skin islands with normal refill  Vioptix 70% bilaterally, dc'ed  doing well   ambulate  DC IVF, O2

## 2024-06-03 ENCOUNTER — TRANSCRIPTION ENCOUNTER (OUTPATIENT)
Age: 49
End: 2024-06-03

## 2024-06-03 VITALS
HEART RATE: 77 BPM | DIASTOLIC BLOOD PRESSURE: 73 MMHG | TEMPERATURE: 98 F | RESPIRATION RATE: 10 BRPM | SYSTOLIC BLOOD PRESSURE: 120 MMHG | OXYGEN SATURATION: 99 %

## 2024-06-03 PROCEDURE — 36415 COLL VENOUS BLD VENIPUNCTURE: CPT

## 2024-06-03 PROCEDURE — C1889: CPT

## 2024-06-03 PROCEDURE — A9541: CPT

## 2024-06-03 PROCEDURE — 88305 TISSUE EXAM BY PATHOLOGIST: CPT

## 2024-06-03 PROCEDURE — C1762: CPT

## 2024-06-03 PROCEDURE — 76098 X-RAY EXAM SURGICAL SPECIMEN: CPT

## 2024-06-03 PROCEDURE — 88307 TISSUE EXAM BY PATHOLOGIST: CPT

## 2024-06-03 PROCEDURE — C9399: CPT

## 2024-06-03 PROCEDURE — 88333 PATH CONSLTJ SURG CYTO XM 1: CPT

## 2024-06-03 PROCEDURE — 88329 PATH CONSLTJ DRG SURG: CPT

## 2024-06-03 PROCEDURE — C1763: CPT

## 2024-06-03 PROCEDURE — 38792 RA TRACER ID OF SENTINL NODE: CPT | Mod: MC

## 2024-06-03 PROCEDURE — 80048 BASIC METABOLIC PNL TOTAL CA: CPT

## 2024-06-03 PROCEDURE — 85027 COMPLETE CBC AUTOMATED: CPT

## 2024-06-03 RX ORDER — OXYCODONE HYDROCHLORIDE 5 MG/1
5 TABLET ORAL ONCE
Refills: 0 | Status: DISCONTINUED | OUTPATIENT
Start: 2024-06-03 | End: 2024-06-03

## 2024-06-03 RX ADMIN — OXYCODONE HYDROCHLORIDE 5 MILLIGRAM(S): 5 TABLET ORAL at 12:48

## 2024-06-03 RX ADMIN — HEPARIN SODIUM 5000 UNIT(S): 5000 INJECTION INTRAVENOUS; SUBCUTANEOUS at 08:19

## 2024-06-03 RX ADMIN — OXYCODONE HYDROCHLORIDE 10 MILLIGRAM(S): 5 TABLET ORAL at 04:12

## 2024-06-03 RX ADMIN — Medication 975 MILLIGRAM(S): at 06:02

## 2024-06-03 RX ADMIN — OXYCODONE HYDROCHLORIDE 5 MILLIGRAM(S): 5 TABLET ORAL at 10:53

## 2024-06-03 RX ADMIN — OXYCODONE HYDROCHLORIDE 5 MILLIGRAM(S): 5 TABLET ORAL at 13:15

## 2024-06-03 RX ADMIN — OXYCODONE HYDROCHLORIDE 5 MILLIGRAM(S): 5 TABLET ORAL at 13:30

## 2024-06-03 RX ADMIN — ESCITALOPRAM OXALATE 20 MILLIGRAM(S): 10 TABLET, FILM COATED ORAL at 12:48

## 2024-06-03 RX ADMIN — OXYCODONE HYDROCHLORIDE 10 MILLIGRAM(S): 5 TABLET ORAL at 03:12

## 2024-06-03 NOTE — PROGRESS NOTE ADULT - SUBJECTIVE AND OBJECTIVE BOX
Patient is a 49y old  Female who presents with a chief complaint of DANIEL (01 Jun 2024 08:57)  pt POD #3 s/p b/l mastectomy with DANIEL reconstruction  no events noted overnight  pt feels well, denies n/v/f/c  able to void, ambulate and tolerate po    Patient seen and examined at bedside    ALLERGIES:  No Known Allergies    MEDICATIONS  (STANDING):  acetaminophen     Tablet .. 975 milliGRAM(s) Oral every 8 hours  escitalopram 20 milliGRAM(s) Oral daily  heparin   Injectable 5000 Unit(s) SubCutaneous every 12 hours  ibuprofen  Tablet. 400 milliGRAM(s) Oral every 8 hours  melatonin 3 milliGRAM(s) Oral at bedtime    MEDICATIONS  (PRN):  HYDROmorphone  Injectable 0.5 milliGRAM(s) IV Push every 4 hours PRN Severe Pain (7 - 10)  metoclopramide Injectable 10 milliGRAM(s) IV Push every 8 hours PRN Nausea and/or Vomiting  ondansetron Injectable 4 milliGRAM(s) IV Push every 6 hours PRN Nausea and/or Vomiting  oxyCODONE    IR 5 milliGRAM(s) Oral every 4 hours PRN Moderate Pain (4 - 6)  oxyCODONE    IR 10 milliGRAM(s) Oral every 4 hours PRN Severe Pain (7 - 10)  senna 1 Tablet(s) Oral at bedtime PRN Constipation    Vital Signs Last 24 Hrs  T(F): 97.4 (03 Jun 2024 12:10), Max: 98 (02 Jun 2024 20:00)  HR: 64 (03 Jun 2024 12:10) (64 - 77)  BP: 100/65 (03 Jun 2024 12:10) (90/52 - 107/65)  RR: 10 (03 Jun 2024 12:10) (10 - 17)  SpO2: 94% (03 Jun 2024 12:10) (94% - 100%)    I&O's Summary    02 Jun 2024 07:01  -  03 Jun 2024 07:00  --------------------------------------------------------  IN: 0 mL / OUT: 345 mL / NET: -345 mL    03 Jun 2024 07:01  -  03 Jun 2024 13:16  --------------------------------------------------------  IN: 0 mL / OUT: 98 mL / NET: -98 mL    PHYSICAL EXAM:  General: NAD, A/O x 3  ENT: MMM  Neck: Supple, No JVD  b/l DANIEL flaps are viable, good color and cap refill of skin paddles, NAC intact b/l, incisions c/d/i, rey drains with serous fluid, Vioptix removed yesterday  Abdomen: Soft, Nontender, Nondistended, umbilicus viable, incision c/d/i, rey drains with serosanguinous fluid  Extremities: No calf tenderness, No pitting edema    LABS:                        10.1   8.26  )-----------( 234      ( 01 Jun 2024 07:35 )             29.6     06-01    142  |  108  |  9   ----------------------------<  119  4.1   |  30  |  0.68    Ca    7.7      01 Jun 2024 07:35                                  Urinalysis Basic - ( 01 Jun 2024 07:35 )    Color: x / Appearance: x / SG: x / pH: x  Gluc: 119 mg/dL / Ketone: x  / Bili: x / Urobili: x   Blood: x / Protein: x / Nitrite: x   Leuk Esterase: x / RBC: x / WBC x   Sq Epi: x / Non Sq Epi: x / Bacteria: x            RADIOLOGY & ADDITIONAL TESTS:    Care Discussed with Consultants/Other Providers:

## 2024-06-03 NOTE — DISCHARGE NOTE NURSING/CASE MANAGEMENT/SOCIAL WORK - NSDCFUADDAPPT_GEN_ALL_CORE_FT
follow up with Dr. Whiting in 2 weeks, please call the office for an appointment    follow up with Dr. So on Thursday 6/6/24

## 2024-06-03 NOTE — PROGRESS NOTE ADULT - ASSESSMENT
Patient is a 49y old  Female who presents with a chief complaint of DANIEL (01 Jun 2024 08:57)  pt POD #3 s/p b/l mastectomy with DANIEL reconstruction  no events noted overnight  pt feels well, denies n/v/f/c  able to void, ambulate and tolerate po      pt doing well from surgical standpoint    vss, pain controlled, exam wnl    drain education complete    d/c home today with home services    case d/w Dr. So
49 year old female POD #1 bilateral mastectomy lymph node dissection with immediate DANIEL flap reconstruction    Plan  Monitor bioptics, VS and drain outputs  Incentive spirometry 10x hour  Advance diet as tolerated  Encourage OOB  Restart home Lexapro  Bowel regimen  Analgesics/antiemetics standing and PRN  Wound care and labs as per surgical tem  DVT ppx
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      -Q1H Vioptix tissue oxygenation monitoring.  -ensure vioptix signal strength >80%  -serial flap assessment for signs of perfusion  -skin assessment for color, firmness, signs of hematoma or other changes  -abdominal incisions site  monitoring  -hourly checks on ELIAN drain output  -pain control  -Morning LABS  -any and all changes or concerns regarding DANIEL procedure, flap, etc will be immediately addressed with primary surgical team 
Patient is a 49y old  Female who presents with a chief complaint of DANIEL (01 Jun 2024 08:57)  pt POD # 1 s/p b/l mastectomy with DANIEL flap reconstruction  no events noted overnight  pt c/o mild incisional pain, denies n/v/f/c      pt doing well from surgical standpoint    vss, labs reviewed    plan  - TOV  - ADAT  - OOB to chair, then ambulate  - serial flap checks  - Vioptix monitoring  - pain control  - IS  - drain care and education      anticipate d/c in am

## 2024-06-03 NOTE — DISCHARGE NOTE NURSING/CASE MANAGEMENT/SOCIAL WORK - NSDCVIVACCINE_GEN_ALL_CORE_FT
Tdap; 22-May-2013 11:00; Meghan Meraz (RN); [id number] (Exp. Date: 25-Sep-2015); injection; left arm; 0.5 ml;   Tdap; 05-Apr-2023 20:16; Alissa Alvarez (RN); [employer]; [id number] (Exp. Date: 01-Jun-2024); IntraMuscular; Deltoid Left.; 0.5 milliLiter(s); VIS (VIS Published: 09-May-2013, VIS Presented: 05-Apr-2023);

## 2024-06-03 NOTE — DISCHARGE NOTE NURSING/CASE MANAGEMENT/SOCIAL WORK - PATIENT PORTAL LINK FT
You can access the FollowMyHealth Patient Portal offered by [facility] by registering at the following website: [email or web address]. By joining [facility]’s FollowMyHealth portal, you will also be able to view your health information using other applications (apps) compatible with our system.

## 2024-06-06 LAB — SURGICAL PATHOLOGY STUDY: SIGNIFICANT CHANGE UP

## 2024-06-07 PROBLEM — D05.12 INTRADUCTAL CARCINOMA IN SITU OF LEFT BREAST: Chronic | Status: ACTIVE | Noted: 2024-05-24

## 2024-06-07 PROBLEM — F41.9 ANXIETY DISORDER, UNSPECIFIED: Chronic | Status: ACTIVE | Noted: 2024-05-24

## 2024-06-09 ENCOUNTER — TRANSCRIPTION ENCOUNTER (OUTPATIENT)
Age: 49
End: 2024-06-09

## 2024-06-10 ENCOUNTER — OUTPATIENT (OUTPATIENT)
Dept: OUTPATIENT SERVICES | Facility: HOSPITAL | Age: 49
LOS: 1 days | End: 2024-06-10
Payer: COMMERCIAL

## 2024-06-10 ENCOUNTER — TRANSCRIPTION ENCOUNTER (OUTPATIENT)
Age: 49
End: 2024-06-10

## 2024-06-10 VITALS
DIASTOLIC BLOOD PRESSURE: 74 MMHG | SYSTOLIC BLOOD PRESSURE: 114 MMHG | WEIGHT: 138.01 LBS | HEART RATE: 59 BPM | HEIGHT: 64 IN | RESPIRATION RATE: 16 BRPM | OXYGEN SATURATION: 97 % | TEMPERATURE: 98 F

## 2024-06-10 VITALS
TEMPERATURE: 98 F | HEART RATE: 67 BPM | DIASTOLIC BLOOD PRESSURE: 64 MMHG | SYSTOLIC BLOOD PRESSURE: 109 MMHG | RESPIRATION RATE: 18 BRPM | OXYGEN SATURATION: 98 %

## 2024-06-10 DIAGNOSIS — Z98.891 HISTORY OF UTERINE SCAR FROM PREVIOUS SURGERY: Chronic | ICD-10-CM

## 2024-06-10 DIAGNOSIS — N65.0 DEFORMITY OF RECONSTRUCTED BREAST: ICD-10-CM

## 2024-06-10 DIAGNOSIS — Z85.3 PERSONAL HISTORY OF MALIGNANT NEOPLASM OF BREAST: ICD-10-CM

## 2024-06-10 DIAGNOSIS — Z90.13 ACQUIRED ABSENCE OF BILATERAL BREASTS AND NIPPLES: Chronic | ICD-10-CM

## 2024-06-10 DIAGNOSIS — Z90.13 ACQUIRED ABSENCE OF BILATERAL BREASTS AND NIPPLES: ICD-10-CM

## 2024-06-10 DIAGNOSIS — Z98.890 OTHER SPECIFIED POSTPROCEDURAL STATES: Chronic | ICD-10-CM

## 2024-06-10 PROCEDURE — 19380 REVJ RECONSTRUCTED BREAST: CPT | Mod: 50

## 2024-06-10 RX ORDER — SODIUM CHLORIDE 9 MG/ML
1000 INJECTION, SOLUTION INTRAVENOUS
Refills: 0 | Status: ACTIVE | OUTPATIENT
Start: 2024-06-10 | End: 2025-05-09

## 2024-06-10 RX ORDER — DOCUSATE SODIUM 100 MG
2 CAPSULE ORAL
Refills: 0 | DISCHARGE

## 2024-06-10 RX ORDER — SODIUM CHLORIDE 9 MG/ML
1000 INJECTION, SOLUTION INTRAVENOUS
Refills: 0 | Status: DISCONTINUED | OUTPATIENT
Start: 2024-06-10 | End: 2024-06-10

## 2024-06-10 RX ORDER — ONDANSETRON 8 MG/1
4 TABLET, FILM COATED ORAL ONCE
Refills: 0 | Status: DISCONTINUED | OUTPATIENT
Start: 2024-06-10 | End: 2024-06-10

## 2024-06-10 RX ORDER — IBUPROFEN 200 MG
2 TABLET ORAL
Refills: 0 | DISCHARGE

## 2024-06-10 RX ORDER — OXYCODONE HYDROCHLORIDE 5 MG/1
1 TABLET ORAL
Refills: 0 | DISCHARGE

## 2024-06-10 RX ORDER — HYDROXYZINE HCL 10 MG
1 TABLET ORAL
Refills: 0 | DISCHARGE

## 2024-06-10 RX ORDER — ESCITALOPRAM OXALATE 10 MG/1
1 TABLET, FILM COATED ORAL
Refills: 0 | DISCHARGE

## 2024-06-10 RX ORDER — OXYCODONE HYDROCHLORIDE 5 MG/1
5 TABLET ORAL ONCE
Refills: 0 | Status: DISCONTINUED | OUTPATIENT
Start: 2024-06-10 | End: 2024-06-10

## 2024-06-10 RX ORDER — HYDROMORPHONE HYDROCHLORIDE 2 MG/ML
0.5 INJECTION INTRAMUSCULAR; INTRAVENOUS; SUBCUTANEOUS
Refills: 0 | Status: DISCONTINUED | OUTPATIENT
Start: 2024-06-10 | End: 2024-06-10

## 2024-06-10 RX ORDER — ACETAMINOPHEN 500 MG
2 TABLET ORAL
Refills: 0 | DISCHARGE

## 2024-06-10 RX ORDER — OXYCODONE AND ACETAMINOPHEN 5; 325 MG/1; MG/1
1 TABLET ORAL EVERY 4 HOURS
Refills: 0 | Status: DISCONTINUED | OUTPATIENT
Start: 2024-06-10 | End: 2024-06-10

## 2024-06-10 RX ADMIN — SODIUM CHLORIDE 50 MILLILITER(S): 9 INJECTION, SOLUTION INTRAVENOUS at 13:58

## 2024-06-10 NOTE — ASU PREOP CHECKLIST - COMMENTS
15 ounces combined of water with black coffee 15 ounces total of both water & black coffee, anesthiologist made aware & OK to proceed

## 2024-06-10 NOTE — ASU PATIENT PROFILE, ADULT - NSICDXPASTSURGICALHX_GEN_ALL_CORE_FT
PAST SURGICAL HISTORY:  H/O rhinoplasty     S/P      S/P      S/P       PAST SURGICAL HISTORY:  H/O rhinoplasty     S/P bilateral mastectomy     S/P      S/P      S/P

## 2024-06-10 NOTE — ASU PREOP CHECKLIST - ALLERGY BAND ON
HPI:  Patient is a 63 year old female presents to  for scheduled procedure. 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 In PACU patient noted to have left facial droop, swelling left side of face. Postop Head CT with hemorrhage right lateral ventricle and postop air. Monitored in ICU EVD open at [id number], no output noted. + dhillon    3/16- POD#1 Patient seen and examined with Dr. Valle this AM. Patient with noted left facial droop which per Son at bedside is new for her. She complains of mild headache. Denies n/v, numb/tingling, weakness, dizziness. Tolerating clears.      ICU Vital Signs Last 24 Hrs  T(C): 36.8 (16 Mar 2022 10:00), Max: 37.6 (15 Mar 2022 18:45)  T(F): 98.2 (16 Mar 2022 10:00), Max: 99.7 (15 Mar 2022 18:45)  HR: 80 (16 Mar 2022 10:00) (80 - 134)  BP: 139/84 (16 Mar 2022 06:00) (113/61 - 151/87)  BP(mean): 97 (16 Mar 2022 06:00) (74 - 97)  ABP: 149/76 (16 Mar 2022 10:00) (119/60 - 156/82)  ABP(mean): 106 (16 Mar 2022 10:00) (82 - 113)  RR: 16 (15 Mar 2022 18:45) (12 - 20)  SpO2: 96% (16 Mar 2022 10:00) (95% - 100%)        MEDICATIONS  (STANDING):  ceFAZolin  Injectable. 1000 milliGRAM(s) IV Push every 8 hours  escitalopram 10 milliGRAM(s) Oral daily  influenza   Vaccine 0.5 milliLiter(s) IntraMuscular once  levothyroxine 137 MICROGram(s) Oral daily  pantoprazole    Tablet 40 milliGRAM(s) Oral before breakfast  sodium chloride 0.9%. 1000 milliLiter(s) (75 mL/Hr) IV Continuous <Continuous>      MEDICATIONS  (PRN):  acetaminophen   IVPB .. 1000 milliGRAM(s) IV Intermittent once PRN Moderate Pain (4 - 6)  benzocaine 15 mG/menthol 3.6 mG Lozenge 1 Lozenge Oral every 2 hours PRN Sore Throat  ondansetron Injectable 4 milliGRAM(s) IV Push every 6 hours PRN Nausea and/or Vomiting      PHYSICAL EXAM:   Constitutional: Awake/alert, sitting up in bed NAD  Neurological:  HD: A&Ox3 Normal affect, No dysarthria or aphasia  left facial droop, VFF, tongue midline  Motor: No pronator drift. Strength 5/5 x 4 extremities no tremor or bradykinesia  Sensation: Intact to LT  Coord: No FTN dysmetria  Gait: Not assessed                          9.0    6.93  )-----------( 217      ( 16 Mar 2022 05:45 )             27.1       03-16    141  |  109<H>  |  9   ----------------------------<  121<H>  3.5   |  25  |  0.54    Ca    8.8      16 Mar 2022 05:45  Phos  3.6     03-16  Mg     2.0     03-16          RADIOLOGY:  Head CT 3/16:  IMPRESSION:  Reidentified is a right frontal craniotomy. Postsurgical extra-axial air is reidentified. Reidentified is a calcified mass lesion at the septum pellucidum, unchanged in appearance since prior exam. Right frontal approach ventriculostomy catheter with tip in the left frontal horn, unchanged in position since prior exam. Small amount of hemorrhage and postprocedural air surrounding the catheter in the right frontal lobe is unchanged. Intraventricular hemorrhage within the right lateral ventricle is reidentified without change. There is decreased mass effect upon the right frontal horn. No significant midline shift is present.                   HPI:  Patient is a 63 year old female presents to  for scheduled procedure. 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 In PACU patient noted to have left facial droop, swelling left side of face. Postop Head CT with hemorrhage right lateral ventricle and postop air. Monitored in ICU EVD open at [id number], no output noted. + dhillon    3/16- POD#1 Patient seen and examined with Dr. Valle this AM. Patient with noted left facial droop which per Son at bedside is new for her. She complains of mild headache. Denies n/v, numb/tingling, weakness, dizziness. Tolerating clears.      ICU Vital Signs Last 24 Hrs  T(C): 36.8 (16 Mar 2022 10:00), Max: 37.6 (15 Mar 2022 18:45)  T(F): 98.2 (16 Mar 2022 10:00), Max: 99.7 (15 Mar 2022 18:45)  HR: 80 (16 Mar 2022 10:00) (80 - 134)  BP: 139/84 (16 Mar 2022 06:00) (113/61 - 151/87)  BP(mean): 97 (16 Mar 2022 06:00) (74 - 97)  ABP: 149/76 (16 Mar 2022 10:00) (119/60 - 156/82)  ABP(mean): 106 (16 Mar 2022 10:00) (82 - 113)  RR: 16 (15 Mar 2022 18:45) (12 - 20)  SpO2: 96% (16 Mar 2022 10:00) (95% - 100%)        MEDICATIONS  (STANDING):  ceFAZolin  Injectable. 1000 milliGRAM(s) IV Push every 8 hours  escitalopram 10 milliGRAM(s) Oral daily  influenza   Vaccine 0.5 milliLiter(s) IntraMuscular once  levothyroxine 137 MICROGram(s) Oral daily  pantoprazole    Tablet 40 milliGRAM(s) Oral before breakfast  sodium chloride 0.9%. 1000 milliLiter(s) (75 mL/Hr) IV Continuous <Continuous>      MEDICATIONS  (PRN):  acetaminophen   IVPB .. 1000 milliGRAM(s) IV Intermittent once PRN Moderate Pain (4 - 6)  benzocaine 15 mG/menthol 3.6 mG Lozenge 1 Lozenge Oral every 2 hours PRN Sore Throat  ondansetron Injectable 4 milliGRAM(s) IV Push every 6 hours PRN Nausea and/or Vomiting      PHYSICAL EXAM:   Constitutional: Awake/alert, sitting up in bed NAD  Neurological:  HD: A&Ox3 Normal affect, No dysarthria or aphasia  left facial droop, VFF, tongue midline  Motor: No pronator drift. Strength 5/5 x 4 extremities no tremor or bradykinesia  Sensation: Intact to LT  Coord: No FTN dysmetria  Gait: Not assessed                          9.0    6.93  )-----------( 217      ( 16 Mar 2022 05:45 )             27.1       03-16    141  |  109<H>  |  9   ----------------------------<  121<H>  3.5   |  25  |  0.54    Ca    8.8      16 Mar 2022 05:45  Phos  3.6     03-16  Mg     2.0     03-16          RADIOLOGY:  Head CT 3/16:  IMPRESSION:  Reidentified is a right frontal craniotomy. Postsurgical extra-axial air is reidentified. Reidentified is a calcified mass lesion at the septum pellucidum, unchanged in appearance since prior exam. Right frontal approach ventriculostomy catheter with tip in the left frontal horn, unchanged in position since prior exam. Small amount of hemorrhage and postprocedural air surrounding the catheter in the right frontal lobe is unchanged. Intraventricular hemorrhage within the right lateral ventricle is reidentified without change. There is decreased mass effect upon the right frontal horn. No significant midline shift is present.                   HPI:  Patient is a 63 year old female presents to  for scheduled procedure. 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 In PACU patient noted to have left facial droop, swelling left side of face. Postop Head CT with hemorrhage right lateral ventricle and postop air. Monitored in ICU EVD open at [id number], no output noted. + dhillon    3/16- POD#1 Patient seen and examined with Dr. Valle this AM. Patient with noted left facial droop which per Son at bedside is new for her. She complains of mild headache. Denies n/v, numb/tingling, weakness, dizziness. Tolerating clears.      ICU Vital Signs Last 24 Hrs  T(C): 36.8 (16 Mar 2022 10:00), Max: 37.6 (15 Mar 2022 18:45)  T(F): 98.2 (16 Mar 2022 10:00), Max: 99.7 (15 Mar 2022 18:45)  HR: 80 (16 Mar 2022 10:00) (80 - 134)  BP: 139/84 (16 Mar 2022 06:00) (113/61 - 151/87)  BP(mean): 97 (16 Mar 2022 06:00) (74 - 97)  ABP: 149/76 (16 Mar 2022 10:00) (119/60 - 156/82)  ABP(mean): 106 (16 Mar 2022 10:00) (82 - 113)  RR: 16 (15 Mar 2022 18:45) (12 - 20)  SpO2: 96% (16 Mar 2022 10:00) (95% - 100%)        MEDICATIONS  (STANDING):  ceFAZolin  Injectable. 1000 milliGRAM(s) IV Push every 8 hours  escitalopram 10 milliGRAM(s) Oral daily  influenza   Vaccine 0.5 milliLiter(s) IntraMuscular once  levothyroxine 137 MICROGram(s) Oral daily  pantoprazole    Tablet 40 milliGRAM(s) Oral before breakfast  sodium chloride 0.9%. 1000 milliLiter(s) (75 mL/Hr) IV Continuous <Continuous>      MEDICATIONS  (PRN):  acetaminophen   IVPB .. 1000 milliGRAM(s) IV Intermittent once PRN Moderate Pain (4 - 6)  benzocaine 15 mG/menthol 3.6 mG Lozenge 1 Lozenge Oral every 2 hours PRN Sore Throat  ondansetron Injectable 4 milliGRAM(s) IV Push every 6 hours PRN Nausea and/or Vomiting      PHYSICAL EXAM:   Constitutional: Awake/alert, sitting up in bed NAD  Neurological:  HD: A&Ox3 Normal affect, No dysarthria or aphasia  left facial droop, VFF, tongue midline  Motor: No pronator drift. Strength 5/5 x 4 extremities no tremor or bradykinesia  Sensation: Intact to LT  Coord: No FTN dysmetria  Gait: Not assessed                          9.0    6.93  )-----------( 217      ( 16 Mar 2022 05:45 )             27.1       03-16    141  |  109<H>  |  9   ----------------------------<  121<H>  3.5   |  25  |  0.54    Ca    8.8      16 Mar 2022 05:45  Phos  3.6     03-16  Mg     2.0     03-16          RADIOLOGY:  Head CT 3/16:  IMPRESSION:  Reidentified is a right frontal craniotomy. Postsurgical extra-axial air is reidentified. Reidentified is a calcified mass lesion at the septum pellucidum, unchanged in appearance since prior exam. Right frontal approach ventriculostomy catheter with tip in the left frontal horn, unchanged in position since prior exam. Small amount of hemorrhage and postprocedural air surrounding the catheter in the right frontal lobe is unchanged. Intraventricular hemorrhage within the right lateral ventricle is reidentified without change. There is decreased mass effect upon the right frontal horn. No significant midline shift is present.                   no known allergies

## 2024-06-10 NOTE — ASU PATIENT PROFILE, ADULT - MEDICATION ADMINISTRATION INFO, PROFILE
Pt seen and examined with Dr. Cleary. Agree with above H&P, physical, and plan.     Saw pt today and reviewed that his current meds are helpful, but not optimized. He denies at least 50% dec in pain with each dose, thus open to having higher dose available to try here and continue with upon dc. He knows to follow up with [facility] team for further pain management.     Physical Exam    PPSV- 50-60%    Gen: Middle aged male, thin, pleasant  Mental Status: AOx4  HEENT: mmm, temporal and clavicular muscle wasting  CVS: +s1 s2 rrr  Lung: dec at bases bl  GI: soft mild distention, + bs, nt  : voids, R nephrostomy tube  Ext/skin: moves all extremities, muscle and fat wasting in limbs  Neuro: no focal deficits    Agree with above plan. Plan also reviewed with Dr. Weeks and floor pharmacist in prep for dc today    Thank you for including us in Mr. Nelson's care. Will continue to follow with you.    Magda Vanegas MD  Palliative Care Attending no concerns

## 2024-06-10 NOTE — ASU DISCHARGE PLAN (ADULT/PEDIATRIC) - NS MD DC FALL RISK RISK
For information on Fall & Injury Prevention, visit: [email or web address] OR  [email or web address] OR  https://www.cdc.gov/steadi/patient.html

## 2024-06-10 NOTE — ASU DISCHARGE PLAN (ADULT/PEDIATRIC) - CARE PROVIDER_API CALL
Jesse So  Plastic Surgery  [address], [address]  [place] [address]  Phone: [phone]  Fax: [phone]  Follow Up Time: 1 week

## 2024-06-10 NOTE — ASU DISCHARGE PLAN (ADULT/PEDIATRIC) - ASU DC SPECIAL INSTRUCTIONSFT
You may shower starting tomorrow. Do not scrub areas in the shower. Do not submerge under water. After showering, pat areas dry and place the bra back on.    Take over the counter medications for pain as needed.    Follow up in 1 week with Dr. So in the office.     Continue to empty and record drain amounts from the prior surgery.

## 2024-11-26 ENCOUNTER — OUTPATIENT (OUTPATIENT)
Dept: OUTPATIENT SERVICES | Facility: HOSPITAL | Age: 49
LOS: 1 days | End: 2024-11-26
Payer: COMMERCIAL

## 2024-11-26 VITALS
HEIGHT: 64 IN | HEART RATE: 62 BPM | TEMPERATURE: 98 F | DIASTOLIC BLOOD PRESSURE: 74 MMHG | WEIGHT: 148.15 LBS | RESPIRATION RATE: 16 BRPM | SYSTOLIC BLOOD PRESSURE: 115 MMHG | OXYGEN SATURATION: 99 %

## 2024-11-26 DIAGNOSIS — Z90.13 ACQUIRED ABSENCE OF BILATERAL BREASTS AND NIPPLES: Chronic | ICD-10-CM

## 2024-11-26 DIAGNOSIS — Z85.3 PERSONAL HISTORY OF MALIGNANT NEOPLASM OF BREAST: ICD-10-CM

## 2024-11-26 DIAGNOSIS — N65.0 DEFORMITY OF RECONSTRUCTED BREAST: ICD-10-CM

## 2024-11-26 DIAGNOSIS — Z98.891 HISTORY OF UTERINE SCAR FROM PREVIOUS SURGERY: Chronic | ICD-10-CM

## 2024-11-26 DIAGNOSIS — Z01.818 ENCOUNTER FOR OTHER PREPROCEDURAL EXAMINATION: ICD-10-CM

## 2024-11-26 DIAGNOSIS — Z90.13 ACQUIRED ABSENCE OF BILATERAL BREASTS AND NIPPLES: ICD-10-CM

## 2024-11-26 DIAGNOSIS — Z98.890 OTHER SPECIFIED POSTPROCEDURAL STATES: Chronic | ICD-10-CM

## 2024-11-26 LAB
HCT VFR BLD CALC: 39.8 % — SIGNIFICANT CHANGE UP (ref 34.5–45)
HGB BLD-MCNC: 13.9 G/DL — SIGNIFICANT CHANGE UP (ref 11.5–15.5)
MCHC RBC-ENTMCNC: 30.9 PG — SIGNIFICANT CHANGE UP (ref 27–34)
MCHC RBC-ENTMCNC: 34.9 G/DL — SIGNIFICANT CHANGE UP (ref 32–36)
MCV RBC AUTO: 88.4 FL — SIGNIFICANT CHANGE UP (ref 80–100)
NRBC # BLD: 0 /100 WBCS — SIGNIFICANT CHANGE UP (ref 0–0)
PLATELET # BLD AUTO: 323 K/UL — SIGNIFICANT CHANGE UP (ref 150–400)
RBC # BLD: 4.5 M/UL — SIGNIFICANT CHANGE UP (ref 3.8–5.2)
RBC # FLD: 12 % — SIGNIFICANT CHANGE UP (ref 10.3–14.5)
WBC # BLD: 5.51 K/UL — SIGNIFICANT CHANGE UP (ref 3.8–10.5)
WBC # FLD AUTO: 5.51 K/UL — SIGNIFICANT CHANGE UP (ref 3.8–10.5)

## 2024-11-26 PROCEDURE — 85027 COMPLETE CBC AUTOMATED: CPT

## 2024-11-26 PROCEDURE — 36415 COLL VENOUS BLD VENIPUNCTURE: CPT

## 2024-11-26 PROCEDURE — G0463: CPT

## 2024-11-26 NOTE — H&P PST ADULT - NSICDXPASTSURGICALHX_GEN_ALL_CORE_FT
PAST SURGICAL HISTORY:  H/O bilateral mastectomy     H/O rhinoplasty     S/P bilateral mastectomy     S/P      S/P      S/P

## 2024-11-26 NOTE — H&P PST ADULT - HISTORY OF PRESENT ILLNESS
This is a 48 y/o female with PMHX of breast cancer,  s/p double mastectomy DANIEL flap repair 5/2024.  Now scheduled for breast reconstruction revision.  Otherwise pt feels well today and denies any acute symptoms.

## 2024-12-05 NOTE — ASU PATIENT PROFILE, ADULT - FALL HARM RISK - UNIVERSAL INTERVENTIONS
Bed in lowest position, wheels locked, appropriate side rails in place/Call bell, personal items and telephone in reach/Instruct patient to call for assistance before getting out of bed or chair/Non-slip footwear when patient is out of bed/[place] to call system/Physically safe environment - no spills, clutter or unnecessary equipment/Purposeful Proactive Rounding/Room/bathroom lighting operational, light cord in reach

## 2024-12-05 NOTE — ASU PATIENT PROFILE, ADULT - NS PRO TALK SOMEONE YN
Please clarify if this patient was treated with: 
 
EXCISIONAL DEBRIDEMENT (the surgical removal or cutting away of such tissue, necrosis, or slough) 
 
or NON-EXCISIONAL DEBRIDEMENT  (the nonoperative brushing, irrigating, scrubbing, or washing of devitalized tissue, necrosis, slough, or foreign material) Per the Debridement op note: A #10 scalpel was used to excise a large full thickness eschar. The wound dimensions are 6 x 6 x 0.3 cm. There is full thickness tissue loss down to subcutaneous fat layer. Please clarify and document your clinical opinion in the progress notes and discharge summary including the definitive and/or presumptive diagnosis, (suspected or probable), related to the above clinical findings. Please include clinical findings supporting your diagnosis. Thanks, BRANDIN MeehanN, RN, [facility] Compliant Documentation Management Program 
[phone] no

## 2024-12-06 ENCOUNTER — TRANSCRIPTION ENCOUNTER (OUTPATIENT)
Age: 49
End: 2024-12-06

## 2024-12-06 ENCOUNTER — OUTPATIENT (OUTPATIENT)
Dept: OUTPATIENT SERVICES | Facility: HOSPITAL | Age: 49
LOS: 1 days | End: 2024-12-06
Payer: COMMERCIAL

## 2024-12-06 VITALS
HEART RATE: 88 BPM | TEMPERATURE: 98 F | SYSTOLIC BLOOD PRESSURE: 122 MMHG | RESPIRATION RATE: 16 BRPM | DIASTOLIC BLOOD PRESSURE: 69 MMHG | OXYGEN SATURATION: 100 %

## 2024-12-06 VITALS
WEIGHT: 148.15 LBS | RESPIRATION RATE: 14 BRPM | TEMPERATURE: 99 F | OXYGEN SATURATION: 98 % | HEIGHT: 64 IN | HEART RATE: 71 BPM | SYSTOLIC BLOOD PRESSURE: 120 MMHG | DIASTOLIC BLOOD PRESSURE: 79 MMHG

## 2024-12-06 DIAGNOSIS — Z90.13 ACQUIRED ABSENCE OF BILATERAL BREASTS AND NIPPLES: Chronic | ICD-10-CM

## 2024-12-06 DIAGNOSIS — N65.0 DEFORMITY OF RECONSTRUCTED BREAST: ICD-10-CM

## 2024-12-06 DIAGNOSIS — Z85.3 PERSONAL HISTORY OF MALIGNANT NEOPLASM OF BREAST: ICD-10-CM

## 2024-12-06 DIAGNOSIS — Z98.890 OTHER SPECIFIED POSTPROCEDURAL STATES: Chronic | ICD-10-CM

## 2024-12-06 DIAGNOSIS — Z98.891 HISTORY OF UTERINE SCAR FROM PREVIOUS SURGERY: Chronic | ICD-10-CM

## 2024-12-06 DIAGNOSIS — Z90.13 ACQUIRED ABSENCE OF BILATERAL BREASTS AND NIPPLES: ICD-10-CM

## 2024-12-06 PROCEDURE — 15772 GRFG AUTOL FAT LIPO EA ADDL: CPT

## 2024-12-06 PROCEDURE — 19380 REVJ RECONSTRUCTED BREAST: CPT | Mod: 50

## 2024-12-06 PROCEDURE — 88302 TISSUE EXAM BY PATHOLOGIST: CPT | Mod: 26

## 2024-12-06 PROCEDURE — 88302 TISSUE EXAM BY PATHOLOGIST: CPT

## 2024-12-06 PROCEDURE — 15771 GRFG AUTOL FAT LIPO 50 CC/<: CPT

## 2024-12-06 RX ORDER — OXYCODONE HYDROCHLORIDE 30 MG/1
5 TABLET ORAL ONCE
Refills: 0 | Status: DISCONTINUED | OUTPATIENT
Start: 2024-12-06 | End: 2024-12-06

## 2024-12-06 RX ORDER — HYDROMORPHONE HYDROCHLORIDE 2 MG/1
1 TABLET ORAL
Refills: 0 | Status: DISCONTINUED | OUTPATIENT
Start: 2024-12-06 | End: 2024-12-06

## 2024-12-06 RX ORDER — 0.9 % SODIUM CHLORIDE 0.9 %
1000 INTRAVENOUS SOLUTION INTRAVENOUS
Refills: 0 | Status: ACTIVE | OUTPATIENT
Start: 2024-12-06 | End: 2025-11-04

## 2024-12-06 RX ORDER — 0.9 % SODIUM CHLORIDE 0.9 %
1000 INTRAVENOUS SOLUTION INTRAVENOUS
Refills: 0 | Status: DISCONTINUED | OUTPATIENT
Start: 2024-12-06 | End: 2024-12-06

## 2024-12-06 RX ORDER — ONDANSETRON HYDROCHLORIDE 4 MG/1
4 TABLET, FILM COATED ORAL ONCE
Refills: 0 | Status: DISCONTINUED | OUTPATIENT
Start: 2024-12-06 | End: 2024-12-06

## 2024-12-06 RX ORDER — HYDROMORPHONE HYDROCHLORIDE 2 MG/1
0.5 TABLET ORAL
Refills: 0 | Status: DISCONTINUED | OUTPATIENT
Start: 2024-12-06 | End: 2024-12-06

## 2024-12-06 RX ORDER — ORAL SEMAGLUTIDE 7 MG/1
1.7 TABLET ORAL
Refills: 0 | DISCHARGE

## 2024-12-06 RX ADMIN — Medication 50 MILLILITER(S): at 06:11

## 2024-12-06 RX ADMIN — HYDROMORPHONE HYDROCHLORIDE 1 MILLIGRAM(S): 2 TABLET ORAL at 10:00

## 2024-12-06 RX ADMIN — HYDROMORPHONE HYDROCHLORIDE 1 MILLIGRAM(S): 2 TABLET ORAL at 10:57

## 2024-12-06 RX ADMIN — OXYCODONE HYDROCHLORIDE 5 MILLIGRAM(S): 30 TABLET ORAL at 10:45

## 2024-12-06 RX ADMIN — OXYCODONE HYDROCHLORIDE 5 MILLIGRAM(S): 30 TABLET ORAL at 11:45

## 2024-12-06 NOTE — BRIEF OPERATIVE NOTE - NSICDXBRIEFPROCEDURE_GEN_ALL_CORE_FT
PROCEDURES:  Reconstruction, breast, bilateral, using fat graft 06-Dec-2024 09:58:54 fat grafting from bilateral thighs, left abdomen. Scar revision left abdomen Yris Smith

## 2024-12-06 NOTE — ASU DISCHARGE PLAN (ADULT/PEDIATRIC) - NS MD DC FALL RISK RISK
For information on Fall & Injury Prevention, visit: [email or web address] OR  [email or web address] OR  https://www.cdc.gov/steadi/patient.html

## 2024-12-06 NOTE — ASU DISCHARGE PLAN (ADULT/PEDIATRIC) - FINANCIAL ASSISTANCE
[facility] provides services at a reduced cost to those who are determined to be eligible through [facility]’s financial assistance program. Information regarding [facility]’s financial assistance program can be found by going to [email or web address] or by calling [phone].

## 2024-12-06 NOTE — ASU DISCHARGE PLAN (ADULT/PEDIATRIC) - PROCEDURE
Bilateral breast revision reconstruction with fat grafting from bilateral thigh and left abdomen, excision of left abdominal tissue

## 2024-12-06 NOTE — ASU DISCHARGE PLAN (ADULT/PEDIATRIC) - ASU DC SPECIAL INSTRUCTIONSFT
You have surgical glue with white steri strips over all of your incisions, with padding on top. In 48 hours, you may remove bra, abdominal binder, and padding to shower. Do not remove steri strips over incisions, these will fall off on their own or be removed in the office. Let water run over incisions and pat dry.     Wear surgical bra and abdominal binder day and night until told otherwise by surgeon. May remove for showering and reapply.   You have wraps around both of your thighs, these may be removed prior to showering or whenever they wont stay up anymore.    All prescriptions have been sent by the office, take as prescribed. Follow surgeons instructions for pain medication.     No heavy lifting until cleared by surgeon, about 6 weeks.    Follow up with Dr. So as directed by office. You have surgical glue with white steri strips over all of your incisions, with padding on top. In 48 hours, you may remove bra, abdominal binder, and padding to shower. Do not remove steri strips over incisions, these will fall off on their own or be removed in the office. Let water run over incisions and pat dry.     Wear surgical bra and abdominal binder day and night until told otherwise by surgeon. May remove for showering and reapply.   You have wraps around both of your thighs, these may be removed prior to showering or whenever they wont stay up anymore. Once removed, wear spanx shorts or something similar for compression on thighs.    All prescriptions have been sent by the office, take as prescribed. Follow surgeons instructions for pain medication.     No heavy lifting until cleared by surgeon, about 6 weeks.    Follow up with Dr. So as directed by office.

## 2024-12-06 NOTE — BRIEF OPERATIVE NOTE - NSICDXBRIEFPOSTOP_GEN_ALL_CORE_FT
POST-OP DIAGNOSIS:  History of malignant neoplasm of breast 06-Dec-2024 10:06:29  Yris Smith  Deformity of reconstructed breast 06-Dec-2024 10:06:25  Yris Smith

## 2024-12-06 NOTE — ASU DISCHARGE PLAN (ADULT/PEDIATRIC) - CARE PROVIDER_API CALL
Jesse So  Plastic Surgery  [address], [address]  [place] [address]  Phone: [phone]  Fax: [phone]  Follow Up Time: 1 week

## 2024-12-06 NOTE — BRIEF OPERATIVE NOTE - NSICDXBRIEFPREOP_GEN_ALL_CORE_FT
PRE-OP DIAGNOSIS:  Deformity of reconstructed breast 06-Dec-2024 10:06:05  Yris Smith  History of malignant neoplasm of breast 06-Dec-2024 10:06:21  Yris Smith

## 2024-12-17 LAB — SURGICAL PATHOLOGY STUDY: SIGNIFICANT CHANGE UP

## (undated) DEVICE — PACK BASIC

## (undated) DEVICE — NDL HYPO NONSAFE 30G X 0.5" (BEIGE)

## (undated) DEVICE — LAP PAD 18 X 18"

## (undated) DEVICE — DRSG KERLIX ROLL 4.5"

## (undated) DEVICE — DRAPE FLUID WARMER 44 X 44"

## (undated) DEVICE — WARMING BLANKET LOWER ADULT

## (undated) DEVICE — DRAIN RESERVOIR FOR JACKSON PRATT 100CC CARDINAL

## (undated) DEVICE — DRSG STERISTRIPS 0.5 X 4"

## (undated) DEVICE — PACK MINOR

## (undated) DEVICE — DRAPE IOBAN 10" X 8"

## (undated) DEVICE — SUT QUILL MONODERM 3-0 30CM PS-2

## (undated) DEVICE — DRAPE TOWEL BLUE 17" X 24"

## (undated) DEVICE — DRSG CURITY GAUZE SPONGE 4 X 4" 12-PLY

## (undated) DEVICE — DRSG DERMABOND PRINEO 60CM

## (undated) DEVICE — GLV 6.5 PROTEXIS (WHITE)

## (undated) DEVICE — DRAPE INSTRUMENT POUCH 6.75" X 11"

## (undated) DEVICE — VENODYNE/SCD SLEEVE CALF LARGE

## (undated) DEVICE — SUT MONOCRYL 3-0 18" PS-2 UNDYED

## (undated) DEVICE — MARKING PEN W RULER

## (undated) DEVICE — WARMING BLANKET FULL UNDERBODY

## (undated) DEVICE — DRSG DERMABOND 0.7ML

## (undated) DEVICE — SYR LUER LOK 10CC

## (undated) DEVICE — SUT PDS II 0 27" CT-2

## (undated) DEVICE — DRSG TEGADERM 6"X8"

## (undated) DEVICE — DRSG BIOPATCH DISK W CHG 1" W 7.0MM HOLE

## (undated) DEVICE — ABDOMINAL BINDER MED/LG 9" X 36"-64"

## (undated) DEVICE — SUT ETHILON 5-0 18" P-3

## (undated) DEVICE — BLADE SCALPEL SAFETYLOCK #15

## (undated) DEVICE — SOL IRR POUR H2O 500ML

## (undated) DEVICE — DRAPE SPLIT SHEET 77" X 108"

## (undated) DEVICE — SYR LUER LOK 3CC

## (undated) DEVICE — SOL IRR POUR NS 0.9% 500ML

## (undated) DEVICE — GLV 7 PROTEXIS (WHITE)

## (undated) DEVICE — ELCTR BOVIE TIP BLADE INSULATED 2.75" EDGE

## (undated) DEVICE — BAG DECANTER IV STERILE

## (undated) DEVICE — SUT VICRYL 3-0 18" PS-2 UNDYED

## (undated) DEVICE — POSITIONER FOAM HEAD CRADLE (PINK)

## (undated) DEVICE — DRAPE 1/2 SHEET 40X57"

## (undated) DEVICE — PREP CHLORAPREP HI-LITE ORANGE 26ML

## (undated) DEVICE — SUT POLYSORB 3-0 30" V-20 UNDYED

## (undated) DEVICE — SENSOR VIOPTIX TISSUE OXIMETER DISP

## (undated) DEVICE — SUT POLYSORB 2-0 30" GS-21 UNDYED

## (undated) DEVICE — SUT SURGIPRO II 3-0 18" C-14

## (undated) DEVICE — CANISTER SUCTION LID GUARD 3000CC

## (undated) DEVICE — VENODYNE/SCD SLEEVE CALF MEDIUM

## (undated) DEVICE — PROTECTOR HEEL / ELBOW FLUFFY

## (undated) DEVICE — ELCTR GROUNDING PAD ADULT COVIDIEN

## (undated) DEVICE — BLADE SCALPEL SAFETYLOCK #10

## (undated) DEVICE — SUT POLYSORB 0 36" GS-21 UNDYED

## (undated) DEVICE — SHEATH SURG GUIDE SCOUT DISP STRL

## (undated) DEVICE — POSITIONER FOAM EGG CRATE ULNAR 2PCS (PINK)

## (undated) DEVICE — STAPLER SKIN VISI-STAT 35 WIDE

## (undated) DEVICE — DRSG COMBINE 5X9"

## (undated) DEVICE — SPECIMEN CONTAINER 100ML

## (undated) DEVICE — SUT MONOCRYL 3-0 27" PS-2 UNDYED

## (undated) DEVICE — POSITIONER STRAP ARMBOARD VELCRO TS-30

## (undated) DEVICE — GLV 8.5 PROTEXIS (WHITE)

## (undated) DEVICE — DRSG STERISTRIPS 0.25 X 4"

## (undated) DEVICE — DRAIN PENROSE 1" X 18" LATEX

## (undated) DEVICE — SUT PLAIN GUT FAST ABSORBING 5-0 PC-1

## (undated) DEVICE — ELCTR BOVIE PENCIL BLADE 10FT

## (undated) DEVICE — GOWN LG

## (undated) DEVICE — SOL IRR POUR H2O 1500ML

## (undated) DEVICE — MERCIAN VISABILITY BACKROUND YELLOW

## (undated) DEVICE — TUBING HI-VAC PSI-TEC STERILE

## (undated) DEVICE — ELCTR STRYKER NEPTUNE SMOKE EVACUATION PENCIL (GREEN)

## (undated) DEVICE — DRAPE 3/4 SHEET W REINFORCEMENT 56X77"

## (undated) DEVICE — SOLIDIFIER CANN EXPRESS 3K

## (undated) DEVICE — POSITIONER PATIENT SAFETY STRAP 3X60"

## (undated) DEVICE — BIPOLAR FORCEP KIRWAN JEWELERS STR 4" X 0.4MM W 12FT CORD (GREEN)

## (undated) DEVICE — MEDICATION LABELS W MARKER

## (undated) DEVICE — SUT MONOCRYL 5-0 18" P-3 UNDYED

## (undated) DEVICE — DRAPE 3/4 SHEET 52X76"

## (undated) DEVICE — SUT ETHIBOND 1 30" CT-1

## (undated) DEVICE — ELCTR BOVIE PENCIL HANDPIECE

## (undated) DEVICE — GLV 7.5 PROTEXIS (WHITE)

## (undated) DEVICE — Device

## (undated) DEVICE — SUT ETHILON 9-0 5" BV100-4

## (undated) DEVICE — ELCTR BIPOLAR CORD J&J 12FT DISP

## (undated) DEVICE — SUT POLYSORB 4-0 18" P-12 UNDYED

## (undated) DEVICE — SUT NYLON 2-0 18" FS

## (undated) DEVICE — FOLEY TRAY 16FR 5CC LTX UMETER CLOSED

## (undated) DEVICE — SUT MONOCRYL 4-0 18" PS-2

## (undated) DEVICE — ONETRAC LIGHTED RETRACTOR 90 X 22MM DISP

## (undated) DEVICE — NDL NERVE STIM 22GA X 2IN 30 DEG

## (undated) DEVICE — BLADE PHOTON 7.5IN / 10.5IN

## (undated) DEVICE — PACK GENERAL MINOR

## (undated) DEVICE — NDL COUNTER FOAM AND MAGNET 40-70

## (undated) DEVICE — SUT MONOCRYL 4-0 27" PS-2 UNDYED

## (undated) DEVICE — SUT SOFSILK 2-0 18" C-23

## (undated) DEVICE — LONE STAR ELASTIC STAY HOOK 12MM BLUNT

## (undated) DEVICE — LABELS BLANK W PEN

## (undated) DEVICE — GLV 8 PROTEXIS (WHITE)

## (undated) DEVICE — DRAPE IOBAN 23" X 23"

## (undated) DEVICE — GOWN TRIMAX LG

## (undated) DEVICE — SYR LUER LOK 50CC

## (undated) DEVICE — TUBING INFILTRATION

## (undated) DEVICE — GAMMA SLEEVE DISPOSABLE

## (undated) DEVICE — PREP BETADINE KIT

## (undated) DEVICE — SOL IRR POUR H2O 250ML

## (undated) DEVICE — SPONGE PEANUT REGULAR 3/8"

## (undated) DEVICE — SPECIMEN TRAP 70ML